# Patient Record
Sex: MALE | Race: BLACK OR AFRICAN AMERICAN | NOT HISPANIC OR LATINO | URBAN - METROPOLITAN AREA
[De-identification: names, ages, dates, MRNs, and addresses within clinical notes are randomized per-mention and may not be internally consistent; named-entity substitution may affect disease eponyms.]

---

## 2018-10-27 ENCOUNTER — INPATIENT (INPATIENT)
Facility: HOSPITAL | Age: 83
LOS: 5 days | Discharge: HOME | End: 2018-11-02
Attending: INTERNAL MEDICINE | Admitting: INTERNAL MEDICINE
Payer: MEDICARE

## 2018-10-27 VITALS
DIASTOLIC BLOOD PRESSURE: 60 MMHG | WEIGHT: 235.89 LBS | HEIGHT: 72 IN | OXYGEN SATURATION: 97 % | HEART RATE: 61 BPM | TEMPERATURE: 99 F | RESPIRATION RATE: 18 BRPM | SYSTOLIC BLOOD PRESSURE: 160 MMHG

## 2018-10-27 DIAGNOSIS — I10 ESSENTIAL (PRIMARY) HYPERTENSION: ICD-10-CM

## 2018-10-27 DIAGNOSIS — Z95.0 PRESENCE OF CARDIAC PACEMAKER: Chronic | ICD-10-CM

## 2018-10-27 DIAGNOSIS — Z29.9 ENCOUNTER FOR PROPHYLACTIC MEASURES, UNSPECIFIED: ICD-10-CM

## 2018-10-27 DIAGNOSIS — K81.9 CHOLECYSTITIS, UNSPECIFIED: ICD-10-CM

## 2018-10-27 DIAGNOSIS — F03.90 UNSPECIFIED DEMENTIA WITHOUT BEHAVIORAL DISTURBANCE: ICD-10-CM

## 2018-10-27 DIAGNOSIS — Z95.0 PRESENCE OF CARDIAC PACEMAKER: ICD-10-CM

## 2018-10-27 DIAGNOSIS — E11.9 TYPE 2 DIABETES MELLITUS WITHOUT COMPLICATIONS: ICD-10-CM

## 2018-10-27 LAB
ABO RH CONFIRMATION: SIGNIFICANT CHANGE UP
ALBUMIN SERPL ELPH-MCNC: 3.3 G/DL — SIGNIFICANT CHANGE UP (ref 3.3–5)
ALP SERPL-CCNC: 78 U/L — SIGNIFICANT CHANGE UP (ref 40–120)
ALT FLD-CCNC: 212 U/L — HIGH (ref 12–78)
ANION GAP SERPL CALC-SCNC: 8 MMOL/L — SIGNIFICANT CHANGE UP (ref 5–17)
APTT BLD: 27.9 SEC — SIGNIFICANT CHANGE UP (ref 27.5–37.4)
AST SERPL-CCNC: 264 U/L — HIGH (ref 15–37)
BASOPHILS # BLD AUTO: 0.02 K/UL — SIGNIFICANT CHANGE UP (ref 0–0.2)
BASOPHILS NFR BLD AUTO: 0.2 % — SIGNIFICANT CHANGE UP (ref 0–2)
BILIRUB SERPL-MCNC: 0.6 MG/DL — SIGNIFICANT CHANGE UP (ref 0.2–1.2)
BLD GP AB SCN SERPL QL: SIGNIFICANT CHANGE UP
BUN SERPL-MCNC: 25 MG/DL — HIGH (ref 7–23)
CALCIUM SERPL-MCNC: 8.2 MG/DL — LOW (ref 8.5–10.1)
CHLORIDE SERPL-SCNC: 108 MMOL/L — SIGNIFICANT CHANGE UP (ref 96–108)
CK MB BLD-MCNC: <0.8 % — SIGNIFICANT CHANGE UP (ref 0–3.5)
CK MB CFR SERPL CALC: <1 NG/ML — SIGNIFICANT CHANGE UP (ref 0.5–3.6)
CK SERPL-CCNC: 131 U/L — SIGNIFICANT CHANGE UP (ref 26–308)
CO2 SERPL-SCNC: 25 MMOL/L — SIGNIFICANT CHANGE UP (ref 22–31)
CREAT SERPL-MCNC: 1.7 MG/DL — HIGH (ref 0.5–1.3)
EOSINOPHIL # BLD AUTO: 0.02 K/UL — SIGNIFICANT CHANGE UP (ref 0–0.5)
EOSINOPHIL NFR BLD AUTO: 0.2 % — SIGNIFICANT CHANGE UP (ref 0–6)
GLUCOSE BLDC GLUCOMTR-MCNC: 149 MG/DL — HIGH (ref 70–99)
GLUCOSE BLDC GLUCOMTR-MCNC: 162 MG/DL — HIGH (ref 70–99)
GLUCOSE SERPL-MCNC: 140 MG/DL — HIGH (ref 70–99)
HCT VFR BLD CALC: 37.1 % — LOW (ref 39–50)
HGB BLD-MCNC: 12.2 G/DL — LOW (ref 13–17)
IMM GRANULOCYTES NFR BLD AUTO: 0.5 % — SIGNIFICANT CHANGE UP (ref 0–1.5)
INR BLD: 1.05 RATIO — SIGNIFICANT CHANGE UP (ref 0.88–1.16)
LACTATE SERPL-SCNC: 1.7 MMOL/L — SIGNIFICANT CHANGE UP (ref 0.7–2)
LIDOCAIN IGE QN: 809 U/L — HIGH (ref 73–393)
LYMPHOCYTES # BLD AUTO: 1.27 K/UL — SIGNIFICANT CHANGE UP (ref 1–3.3)
LYMPHOCYTES # BLD AUTO: 11.8 % — LOW (ref 13–44)
MCHC RBC-ENTMCNC: 30 PG — SIGNIFICANT CHANGE UP (ref 27–34)
MCHC RBC-ENTMCNC: 32.9 GM/DL — SIGNIFICANT CHANGE UP (ref 32–36)
MCV RBC AUTO: 91.4 FL — SIGNIFICANT CHANGE UP (ref 80–100)
MONOCYTES # BLD AUTO: 0.8 K/UL — SIGNIFICANT CHANGE UP (ref 0–0.9)
MONOCYTES NFR BLD AUTO: 7.4 % — SIGNIFICANT CHANGE UP (ref 2–14)
NEUTROPHILS # BLD AUTO: 8.63 K/UL — HIGH (ref 1.8–7.4)
NEUTROPHILS NFR BLD AUTO: 79.9 % — HIGH (ref 43–77)
NRBC # BLD: 0 /100 WBCS — SIGNIFICANT CHANGE UP (ref 0–0)
PLATELET # BLD AUTO: 160 K/UL — SIGNIFICANT CHANGE UP (ref 150–400)
POTASSIUM SERPL-MCNC: 5 MMOL/L — SIGNIFICANT CHANGE UP (ref 3.5–5.3)
POTASSIUM SERPL-SCNC: 5 MMOL/L — SIGNIFICANT CHANGE UP (ref 3.5–5.3)
PROT SERPL-MCNC: 6.9 GM/DL — SIGNIFICANT CHANGE UP (ref 6–8.3)
PROTHROM AB SERPL-ACNC: 11.5 SEC — SIGNIFICANT CHANGE UP (ref 9.8–12.7)
RBC # BLD: 4.06 M/UL — LOW (ref 4.2–5.8)
RBC # FLD: 13.4 % — SIGNIFICANT CHANGE UP (ref 10.3–14.5)
SODIUM SERPL-SCNC: 141 MMOL/L — SIGNIFICANT CHANGE UP (ref 135–145)
TROPONIN I SERPL-MCNC: 0.02 NG/ML — SIGNIFICANT CHANGE UP (ref 0.01–0.04)
WBC # BLD: 10.79 K/UL — HIGH (ref 3.8–10.5)
WBC # FLD AUTO: 10.79 K/UL — HIGH (ref 3.8–10.5)

## 2018-10-27 PROCEDURE — 93010 ELECTROCARDIOGRAM REPORT: CPT

## 2018-10-27 PROCEDURE — 76700 US EXAM ABDOM COMPLETE: CPT | Mod: 26

## 2018-10-27 PROCEDURE — 99223 1ST HOSP IP/OBS HIGH 75: CPT

## 2018-10-27 PROCEDURE — 71045 X-RAY EXAM CHEST 1 VIEW: CPT | Mod: 26

## 2018-10-27 PROCEDURE — 99285 EMERGENCY DEPT VISIT HI MDM: CPT

## 2018-10-27 PROCEDURE — 74177 CT ABD & PELVIS W/CONTRAST: CPT | Mod: 26

## 2018-10-27 RX ORDER — DEXTROSE 50 % IN WATER 50 %
25 SYRINGE (ML) INTRAVENOUS ONCE
Qty: 0 | Refills: 0 | Status: DISCONTINUED | OUTPATIENT
Start: 2018-10-27 | End: 2018-11-02

## 2018-10-27 RX ORDER — METOPROLOL TARTRATE 50 MG
1 TABLET ORAL
Qty: 0 | Refills: 0 | COMMUNITY

## 2018-10-27 RX ORDER — PANTOPRAZOLE SODIUM 20 MG/1
40 TABLET, DELAYED RELEASE ORAL
Qty: 0 | Refills: 0 | Status: DISCONTINUED | OUTPATIENT
Start: 2018-10-27 | End: 2018-11-02

## 2018-10-27 RX ORDER — LOSARTAN POTASSIUM 100 MG/1
100 TABLET, FILM COATED ORAL DAILY
Qty: 0 | Refills: 0 | Status: DISCONTINUED | OUTPATIENT
Start: 2018-10-27 | End: 2018-10-29

## 2018-10-27 RX ORDER — INFLUENZA VIRUS VACCINE 15; 15; 15; 15 UG/.5ML; UG/.5ML; UG/.5ML; UG/.5ML
0.5 SUSPENSION INTRAMUSCULAR ONCE
Qty: 0 | Refills: 0 | Status: DISCONTINUED | OUTPATIENT
Start: 2018-10-27 | End: 2018-11-02

## 2018-10-27 RX ORDER — LOSARTAN POTASSIUM 100 MG/1
1 TABLET, FILM COATED ORAL
Qty: 0 | Refills: 0 | COMMUNITY

## 2018-10-27 RX ORDER — METOPROLOL TARTRATE 50 MG
50 TABLET ORAL DAILY
Qty: 0 | Refills: 0 | Status: DISCONTINUED | OUTPATIENT
Start: 2018-10-27 | End: 2018-11-02

## 2018-10-27 RX ORDER — MEROPENEM 1 G/30ML
INJECTION INTRAVENOUS
Qty: 0 | Refills: 0 | Status: DISCONTINUED | OUTPATIENT
Start: 2018-10-27 | End: 2018-10-27

## 2018-10-27 RX ORDER — HEPARIN SODIUM 5000 [USP'U]/ML
5000 INJECTION INTRAVENOUS; SUBCUTANEOUS EVERY 12 HOURS
Qty: 0 | Refills: 0 | Status: DISCONTINUED | OUTPATIENT
Start: 2018-10-27 | End: 2018-11-02

## 2018-10-27 RX ORDER — ASPIRIN/CALCIUM CARB/MAGNESIUM 324 MG
81 TABLET ORAL DAILY
Qty: 0 | Refills: 0 | Status: DISCONTINUED | OUTPATIENT
Start: 2018-10-27 | End: 2018-10-27

## 2018-10-27 RX ORDER — LABETALOL HCL 100 MG
10 TABLET ORAL ONCE
Qty: 0 | Refills: 0 | Status: COMPLETED | OUTPATIENT
Start: 2018-10-27 | End: 2018-10-27

## 2018-10-27 RX ORDER — SODIUM CHLORIDE 9 MG/ML
2000 INJECTION INTRAMUSCULAR; INTRAVENOUS; SUBCUTANEOUS ONCE
Qty: 0 | Refills: 0 | Status: COMPLETED | OUTPATIENT
Start: 2018-10-27 | End: 2018-10-27

## 2018-10-27 RX ORDER — LINACLOTIDE 145 UG/1
1 CAPSULE, GELATIN COATED ORAL
Qty: 0 | Refills: 0 | COMMUNITY

## 2018-10-27 RX ORDER — ASPIRIN/CALCIUM CARB/MAGNESIUM 324 MG
1 TABLET ORAL
Qty: 0 | Refills: 0 | COMMUNITY

## 2018-10-27 RX ORDER — ACETAMINOPHEN 500 MG
650 TABLET ORAL EVERY 6 HOURS
Qty: 0 | Refills: 0 | Status: DISCONTINUED | OUTPATIENT
Start: 2018-10-27 | End: 2018-11-02

## 2018-10-27 RX ORDER — METRONIDAZOLE 500 MG
500 TABLET ORAL ONCE
Qty: 0 | Refills: 0 | Status: COMPLETED | OUTPATIENT
Start: 2018-10-27 | End: 2018-10-27

## 2018-10-27 RX ORDER — SODIUM CHLORIDE 9 MG/ML
1000 INJECTION, SOLUTION INTRAVENOUS
Qty: 0 | Refills: 0 | Status: DISCONTINUED | OUTPATIENT
Start: 2018-10-27 | End: 2018-10-28

## 2018-10-27 RX ORDER — MEROPENEM 1 G/30ML
INJECTION INTRAVENOUS
Qty: 0 | Refills: 0 | Status: DISCONTINUED | OUTPATIENT
Start: 2018-10-27 | End: 2018-11-02

## 2018-10-27 RX ORDER — SODIUM CHLORIDE 9 MG/ML
1000 INJECTION, SOLUTION INTRAVENOUS
Qty: 0 | Refills: 0 | Status: DISCONTINUED | OUTPATIENT
Start: 2018-10-27 | End: 2018-11-02

## 2018-10-27 RX ORDER — MEROPENEM 1 G/30ML
1000 INJECTION INTRAVENOUS EVERY 8 HOURS
Qty: 0 | Refills: 0 | Status: DISCONTINUED | OUTPATIENT
Start: 2018-10-28 | End: 2018-11-02

## 2018-10-27 RX ORDER — METRONIDAZOLE 500 MG
TABLET ORAL
Qty: 0 | Refills: 0 | Status: DISCONTINUED | OUTPATIENT
Start: 2018-10-27 | End: 2018-11-02

## 2018-10-27 RX ORDER — GLUCAGON INJECTION, SOLUTION 0.5 MG/.1ML
1 INJECTION, SOLUTION SUBCUTANEOUS ONCE
Qty: 0 | Refills: 0 | Status: DISCONTINUED | OUTPATIENT
Start: 2018-10-27 | End: 2018-11-02

## 2018-10-27 RX ORDER — METRONIDAZOLE 500 MG
500 TABLET ORAL EVERY 8 HOURS
Qty: 0 | Refills: 0 | Status: DISCONTINUED | OUTPATIENT
Start: 2018-10-28 | End: 2018-11-02

## 2018-10-27 RX ORDER — MEROPENEM 1 G/30ML
1000 INJECTION INTRAVENOUS ONCE
Qty: 0 | Refills: 0 | Status: COMPLETED | OUTPATIENT
Start: 2018-10-27 | End: 2018-10-27

## 2018-10-27 RX ORDER — DEXTROSE 50 % IN WATER 50 %
15 SYRINGE (ML) INTRAVENOUS ONCE
Qty: 0 | Refills: 0 | Status: DISCONTINUED | OUTPATIENT
Start: 2018-10-27 | End: 2018-11-02

## 2018-10-27 RX ORDER — DEXTROSE 50 % IN WATER 50 %
12.5 SYRINGE (ML) INTRAVENOUS ONCE
Qty: 0 | Refills: 0 | Status: DISCONTINUED | OUTPATIENT
Start: 2018-10-27 | End: 2018-11-02

## 2018-10-27 RX ORDER — HYDROCHLOROTHIAZIDE 25 MG
12.5 TABLET ORAL DAILY
Qty: 0 | Refills: 0 | Status: DISCONTINUED | OUTPATIENT
Start: 2018-10-27 | End: 2018-10-29

## 2018-10-27 RX ADMIN — SODIUM CHLORIDE 60 MILLILITER(S): 9 INJECTION, SOLUTION INTRAVENOUS at 20:36

## 2018-10-27 RX ADMIN — MEROPENEM 100 MILLIGRAM(S): 1 INJECTION INTRAVENOUS at 21:18

## 2018-10-27 RX ADMIN — Medication 100 MILLIGRAM(S): at 22:30

## 2018-10-27 RX ADMIN — Medication 10 MILLIGRAM(S): at 22:30

## 2018-10-27 RX ADMIN — SODIUM CHLORIDE 1000 MILLILITER(S): 9 INJECTION INTRAMUSCULAR; INTRAVENOUS; SUBCUTANEOUS at 17:28

## 2018-10-27 RX ADMIN — SODIUM CHLORIDE 2000 MILLILITER(S): 9 INJECTION INTRAMUSCULAR; INTRAVENOUS; SUBCUTANEOUS at 19:52

## 2018-10-27 RX ADMIN — Medication 10 MILLIGRAM(S): at 20:36

## 2018-10-27 NOTE — H&P ADULT - NSHPREVIEWOFSYSTEMS_GEN_ALL_CORE
REVIEW OF SYSTEMS:  Constitutional: Denies fever, chills or weight loss.  Reports feeling "tired" for past few days.  Eye: Denies eye pain, visual changes, discharge, blurred vision  ENT: Denies hearing changes, tinnitus, vertigo, sinus congestion, sore throat  Neck: Denies pain or stiffness  Respiratory: Denies cough, wheezing, chills, hemoptysis, shortness of breath, difficulty breathing  Cardiovascular: Denies chest pain, palpitations, dizziness, leg swelling  Gastrointestinal: See HPI  Genitourinary: Denies dysuria, frequency, hematuria, retention, incontinence  Neurological: Denies headaches, loss of strength, numbness, tremors.  + h/o dementia.    Skin: Denies itching, burning, rashes, lesions   Endocrine: Denies heat or cold intolerance, hair loss  Musculoskeletal: Denies joint pain or swelling, back, extremity pain  Psychiatric: Denies depression, anxiety, mood swings, difficulty sleeping, suicidal ideation  Hematology: Denies easy bruising, bleeding gums  Immunologic: Denies hives or eczema

## 2018-10-27 NOTE — H&P ADULT - PROBLEM SELECTOR PLAN 1
-RUQ sonogram ordered and pending  -Surgery consult called by ED; will follow-up recommendations  -Keep NPO  -IVF  -Broad spectrum antibiotics - meropenum -RUQ sonogram ordered and pending  -Surgery consult called by ED; will follow-up recommendations  -Keep NPO  -IVF  -Broad spectrum antibiotics

## 2018-10-27 NOTE — ED PROVIDER NOTE - MEDICAL DECISION MAKING DETAILS
possible cholecystitis noted - will admit for further care with Dr. Mccartney - Dr. Jordan called for admission as pt may need HIDA scan - Surgeon - Dr. Juarez aware and will consult.

## 2018-10-27 NOTE — ED ADULT NURSE NOTE - PMH
Dementia    DM II (diabetes mellitus, type II), controlled    HLD (hyperlipidemia)    HTN (hypertension)    Pacemaker

## 2018-10-27 NOTE — H&P ADULT - NSHPPHYSICALEXAM_GEN_ALL_CORE
Vital Signs Last 24 Hrs  T(C): 38.3 (27 Oct 2018 18:54), Max: 38.3 (27 Oct 2018 18:54)  T(F): 101 (27 Oct 2018 18:54), Max: 101 (27 Oct 2018 18:54)  HR: 80 (27 Oct 2018 18:41) (61 - 80)  BP: 192/88 (27 Oct 2018 18:41) (160/60 - 192/88)  BP(mean): --  RR: 16 (27 Oct 2018 18:41) (16 - 18)  SpO2: 98% (27 Oct 2018 18:41) (97% - 98%)    PHYSICAL EXAM:  GENERAL: NAD, well-groomed, well-developed  HEAD:  Atraumatic, Normocephalic  EYES: PERRLA, conjunctiva and sclera clear  NECK: Supple, No JVD, Normal thyroid  CHEST/LUNG: Clear to auscultation bilaterally; No rales, rhonchi, wheezing, or rubs  HEART: Clear S1, S2. Regular rate and rhythm; No murmurs appreciated  ABDOMEN: Distended, non-tender; Bowel sounds present  MSK: ROM intact in all extremities.  EXTREMITIES:  2+ Peripheral Pulses, No clubbing, cyanosis, or edema  NEUROLOGIC: Alert & oriented; no focal deficits  SKIN: No rashes or lesions

## 2018-10-27 NOTE — ED PROVIDER NOTE - OBJECTIVE STATEMENT
87 year old male with PMH of DM II, HTN, HLD, dementia, arthritis presenting to ED due to generalized abd pain started this AM, otherwise last bowel movement was Monday.  some feeling of SOB, polyuria, no blood in stool or urine. No nausea/vomiting at this time, no diarrhea, but constipation present. 87 year old male with PMH of DM II, HTN, HLD, dementia, PPM, arthritis presenting to ED due to generalized abd pain started this AM, otherwise last bowel movement was Monday.  some feeling of SOB, polyuria, no blood in stool or urine. No nausea/vomiting at this time, no diarrhea, but constipation present.

## 2018-10-27 NOTE — H&P ADULT - HISTORY OF PRESENT ILLNESS
86 y/o M with PMHx of DM, HTN, s/p PPM, dyslipidemia, and dementia presents complaining of generalized abdominal pain since this am.  States abdominal pain is crampy in nature, 7-8/10 severity, and without radiation to flank or back.  Patient states his last BM was on Monday 10/22/18.  Reports h/o constipation but states he has never gone longer than 3 days without a BM.  Reports feeling "tired" for the past few days but denies fever, chills, CP, palpitations, SOB, diarrhea or urinary symptoms.  Reports nausea this am but denies vomiting.  Denies recent travel or sick contacts.

## 2018-10-27 NOTE — ED PROVIDER NOTE - PMH
Dementia    DM II (diabetes mellitus, type II), controlled    HLD (hyperlipidemia)    HTN (hypertension) Dementia    DM II (diabetes mellitus, type II), controlled    HLD (hyperlipidemia)    HTN (hypertension)    Pacemaker

## 2018-10-27 NOTE — H&P ADULT - ASSESSMENT
86 y/o M with PMHx of DM, HTN, s/p PPM, dyslipidemia and dementia presents with generalized abdominal pain since this am and constipation (last BM 10/22/18).  CT A/P shows questionable acute cholecystitis and patient found to be febrile in the ED to 101.

## 2018-10-28 LAB
ALBUMIN SERPL ELPH-MCNC: 2.9 G/DL — LOW (ref 3.3–5)
ALP SERPL-CCNC: 72 U/L — SIGNIFICANT CHANGE UP (ref 40–120)
ALT FLD-CCNC: 170 U/L — HIGH (ref 12–78)
AMYLASE P1 CFR SERPL: 55 U/L — SIGNIFICANT CHANGE UP (ref 25–115)
ANION GAP SERPL CALC-SCNC: 9 MMOL/L — SIGNIFICANT CHANGE UP (ref 5–17)
AST SERPL-CCNC: 111 U/L — HIGH (ref 15–37)
BILIRUB DIRECT SERPL-MCNC: 0.29 MG/DL — HIGH (ref 0.05–0.2)
BILIRUB INDIRECT FLD-MCNC: 0.8 MG/DL — SIGNIFICANT CHANGE UP (ref 0.2–1)
BILIRUB SERPL-MCNC: 1.1 MG/DL — SIGNIFICANT CHANGE UP (ref 0.2–1.2)
BUN SERPL-MCNC: 24 MG/DL — HIGH (ref 7–23)
CALCIUM SERPL-MCNC: 7.3 MG/DL — LOW (ref 8.5–10.1)
CHLORIDE SERPL-SCNC: 108 MMOL/L — SIGNIFICANT CHANGE UP (ref 96–108)
CO2 SERPL-SCNC: 23 MMOL/L — SIGNIFICANT CHANGE UP (ref 22–31)
CREAT SERPL-MCNC: 2.11 MG/DL — HIGH (ref 0.5–1.3)
GLUCOSE BLDC GLUCOMTR-MCNC: 161 MG/DL — HIGH (ref 70–99)
GLUCOSE BLDC GLUCOMTR-MCNC: 166 MG/DL — HIGH (ref 70–99)
GLUCOSE BLDC GLUCOMTR-MCNC: 173 MG/DL — HIGH (ref 70–99)
GLUCOSE BLDC GLUCOMTR-MCNC: 192 MG/DL — HIGH (ref 70–99)
GLUCOSE SERPL-MCNC: 164 MG/DL — HIGH (ref 70–99)
HBA1C BLD-MCNC: 7 % — HIGH (ref 4–5.6)
HCT VFR BLD CALC: 34.5 % — LOW (ref 39–50)
HGB BLD-MCNC: 11.3 G/DL — LOW (ref 13–17)
LIDOCAIN IGE QN: 222 U/L — SIGNIFICANT CHANGE UP (ref 73–393)
MCHC RBC-ENTMCNC: 30.4 PG — SIGNIFICANT CHANGE UP (ref 27–34)
MCHC RBC-ENTMCNC: 32.8 GM/DL — SIGNIFICANT CHANGE UP (ref 32–36)
MCV RBC AUTO: 92.7 FL — SIGNIFICANT CHANGE UP (ref 80–100)
NRBC # BLD: 0 /100 WBCS — SIGNIFICANT CHANGE UP (ref 0–0)
PLATELET # BLD AUTO: 140 K/UL — LOW (ref 150–400)
POTASSIUM SERPL-MCNC: 4.8 MMOL/L — SIGNIFICANT CHANGE UP (ref 3.5–5.3)
POTASSIUM SERPL-SCNC: 4.8 MMOL/L — SIGNIFICANT CHANGE UP (ref 3.5–5.3)
PROT SERPL-MCNC: 7 GM/DL — SIGNIFICANT CHANGE UP (ref 6–8.3)
RBC # BLD: 3.72 M/UL — LOW (ref 4.2–5.8)
RBC # FLD: 13.7 % — SIGNIFICANT CHANGE UP (ref 10.3–14.5)
SODIUM SERPL-SCNC: 140 MMOL/L — SIGNIFICANT CHANGE UP (ref 135–145)
WBC # BLD: 13.55 K/UL — HIGH (ref 3.8–10.5)
WBC # FLD AUTO: 13.55 K/UL — HIGH (ref 3.8–10.5)

## 2018-10-28 RX ORDER — SODIUM CHLORIDE 9 MG/ML
1000 INJECTION, SOLUTION INTRAVENOUS
Qty: 0 | Refills: 0 | Status: DISCONTINUED | OUTPATIENT
Start: 2018-10-28 | End: 2018-11-02

## 2018-10-28 RX ADMIN — Medication 650 MILLIGRAM(S): at 17:30

## 2018-10-28 RX ADMIN — MEROPENEM 100 MILLIGRAM(S): 1 INJECTION INTRAVENOUS at 05:12

## 2018-10-28 RX ADMIN — Medication 100 MILLIGRAM(S): at 13:43

## 2018-10-28 RX ADMIN — Medication 650 MILLIGRAM(S): at 05:48

## 2018-10-28 RX ADMIN — Medication 12.5 MILLIGRAM(S): at 05:48

## 2018-10-28 RX ADMIN — Medication 50 MILLIGRAM(S): at 05:47

## 2018-10-28 RX ADMIN — MEROPENEM 100 MILLIGRAM(S): 1 INJECTION INTRAVENOUS at 21:44

## 2018-10-28 RX ADMIN — HEPARIN SODIUM 5000 UNIT(S): 5000 INJECTION INTRAVENOUS; SUBCUTANEOUS at 05:48

## 2018-10-28 RX ADMIN — PANTOPRAZOLE SODIUM 40 MILLIGRAM(S): 20 TABLET, DELAYED RELEASE ORAL at 07:59

## 2018-10-28 RX ADMIN — Medication 650 MILLIGRAM(S): at 16:11

## 2018-10-28 RX ADMIN — SODIUM CHLORIDE 75 MILLILITER(S): 9 INJECTION, SOLUTION INTRAVENOUS at 17:57

## 2018-10-28 RX ADMIN — HEPARIN SODIUM 5000 UNIT(S): 5000 INJECTION INTRAVENOUS; SUBCUTANEOUS at 17:56

## 2018-10-28 RX ADMIN — Medication 100 MILLIGRAM(S): at 21:45

## 2018-10-28 RX ADMIN — LOSARTAN POTASSIUM 100 MILLIGRAM(S): 100 TABLET, FILM COATED ORAL at 05:47

## 2018-10-28 RX ADMIN — MEROPENEM 100 MILLIGRAM(S): 1 INJECTION INTRAVENOUS at 13:42

## 2018-10-28 RX ADMIN — Medication 100 MILLIGRAM(S): at 05:13

## 2018-10-28 RX ADMIN — SODIUM CHLORIDE 75 MILLILITER(S): 9 INJECTION, SOLUTION INTRAVENOUS at 11:51

## 2018-10-28 RX ADMIN — SODIUM CHLORIDE 60 MILLILITER(S): 9 INJECTION, SOLUTION INTRAVENOUS at 05:12

## 2018-10-28 NOTE — PROGRESS NOTE ADULT - SUBJECTIVE AND OBJECTIVE BOX
INTERVAL HPI/OVERNIGHT EVENTS: No acute events overnight Patient states that he feels alright. No change since when he was in ED.    Vital Signs Last 24 Hrs  T(C): 38.3 (28 Oct 2018 05:21), Max: 38.3 (27 Oct 2018 18:54)  T(F): 101 (28 Oct 2018 05:21), Max: 101 (27 Oct 2018 18:54)  HR: 79 (28 Oct 2018 05:21) (61 - 82)  BP: 155/50 (28 Oct 2018 05:21) (131/63 - 192/88)  BP(mean): --  RR: 18 (28 Oct 2018 05:21) (16 - 18)  SpO2: 96% (28 Oct 2018 05:21) (95% - 100%)    MEDICATIONS  (STANDING):  dextrose 5% + sodium chloride 0.45%. 1000 milliLiter(s) (60 mL/Hr) IV Continuous <Continuous>  dextrose 5%. 1000 milliLiter(s) (50 mL/Hr) IV Continuous <Continuous>  dextrose 50% Injectable 12.5 Gram(s) IV Push once  dextrose 50% Injectable 25 Gram(s) IV Push once  dextrose 50% Injectable 25 Gram(s) IV Push once  heparin  Injectable 5000 Unit(s) SubCutaneous every 12 hours  hydrochlorothiazide 12.5 milliGRAM(s) Oral daily  influenza   Vaccine 0.5 milliLiter(s) IntraMuscular once  levoFLOXacin IVPB 500 milliGRAM(s) IV Intermittent every 24 hours  levoFLOXacin IVPB      losartan 100 milliGRAM(s) Oral daily  meropenem  IVPB      meropenem  IVPB 1000 milliGRAM(s) IV Intermittent every 8 hours  metoprolol succinate ER 50 milliGRAM(s) Oral daily  metroNIDAZOLE  IVPB      metroNIDAZOLE  IVPB 500 milliGRAM(s) IV Intermittent every 8 hours  pantoprazole    Tablet 40 milliGRAM(s) Oral before breakfast    MEDICATIONS  (PRN):  acetaminophen  Suppository .. 650 milliGRAM(s) Rectal every 6 hours PRN Temp greater or equal to 38C (100.4F)  dextrose 40% Gel 15 Gram(s) Oral once PRN Blood Glucose LESS THAN 70 milliGRAM(s)/deciliter  glucagon  Injectable 1 milliGRAM(s) IntraMuscular once PRN Glucose LESS THAN 70 milligrams/deciliter      PHYSICAL EXAM    GENERAL: AAO in NAD  CHEST/LUNG: No respiratory distress  ABDOMEN: Soft, markedly distended. RUQ TTP. No epigastric tenderness. No change since prior exam.    I&O:  I&O's Detail    27 Oct 2018 07:01  -  28 Oct 2018 07:00  --------------------------------------------------------  IN:  Total IN: 0 mL    OUT:    Voided: 450 mL  Total OUT: 450 mL    Total NET: -450 mL          LABS:                        12.2   10.79 )-----------( 160      ( 27 Oct 2018 15:56 )             37.1     10-27    141  |  108  |  25<H>  ----------------------------<  140<H>  5.0   |  25  |  1.70<H>    Ca    8.2<L>      27 Oct 2018 15:56    TPro  6.9  /  Alb  3.3  /  TBili  0.6  /  DBili  x   /  AST  264<H>  /  ALT  212<H>  /  AlkPhos  78  10-27    PT/INR - ( 27 Oct 2018 15:56 )   PT: 11.5 sec;   INR: 1.05 ratio         PTT - ( 27 Oct 2018 15:56 )  PTT:27.9 sec          Impression: 87M with likely gallstone pancreatitis, resolving with persistent RUQ pain.    Plan:  AM labs  Pain control  Will need to discuss option of having GB removed during this hospitalization given medical risks.  Continue ABX

## 2018-10-28 NOTE — PROGRESS NOTE ADULT - SUBJECTIVE AND OBJECTIVE BOX
INTERVAL HPI/OVERNIGHT EVENTS:    events  of  past  24  hours  noted  discussed  with ER MD gonzalez  to  medicine  by  hospitalist  as per  [facility]  protocol  seen  by  surgery  continues  with  AB  for  HID  scan    REVIEW OF SYSTEMS:  CONSTITUTIONAL:  no  complaints    NECK: No pain or stiffnes  RESPIRATORY: No SOB   CARDIOVASCULAR: No chest pain, palpitations, dizziness,   GASTROINTESTINAL:mild   abdominal pain. No nausea, vomiting,   NEUROLOGICAL: No headaches, no  blurry  vision no  dizziness  SKIN: No itching,   MUSCULOSKELETAL: No pain    MEDICATION:  acetaminophen  Suppository .. 650 milliGRAM(s) Rectal every 6 hours PRN  dextrose 40% Gel 15 Gram(s) Oral once PRN  dextrose 5% + sodium chloride 0.45%. 1000 milliLiter(s) IV Continuous <Continuous>  dextrose 5%. 1000 milliLiter(s) IV Continuous <Continuous>  dextrose 50% Injectable 12.5 Gram(s) IV Push once  dextrose 50% Injectable 25 Gram(s) IV Push once  dextrose 50% Injectable 25 Gram(s) IV Push once  glucagon  Injectable 1 milliGRAM(s) IntraMuscular once PRN  heparin  Injectable 5000 Unit(s) SubCutaneous every 12 hours  hydrochlorothiazide 12.5 milliGRAM(s) Oral daily  influenza   Vaccine 0.5 milliLiter(s) IntraMuscular once  levoFLOXacin IVPB 500 milliGRAM(s) IV Intermittent every 24 hours  levoFLOXacin IVPB      losartan 100 milliGRAM(s) Oral daily  meropenem  IVPB      meropenem  IVPB 1000 milliGRAM(s) IV Intermittent every 8 hours  metoprolol succinate ER 50 milliGRAM(s) Oral daily  metroNIDAZOLE  IVPB      metroNIDAZOLE  IVPB 500 milliGRAM(s) IV Intermittent every 8 hours  pantoprazole    Tablet 40 milliGRAM(s) Oral before breakfast    Vital Signs Last 24 Hrs  T(C): 37.2 (28 Oct 2018 10:50), Max: 38.3 (27 Oct 2018 18:54)  T(F): 99 (28 Oct 2018 10:50), Max: 101 (27 Oct 2018 18:54)  HR: 75 (28 Oct 2018 10:50) (61 - 82)  BP: 159/72 (28 Oct 2018 10:50) (131/63 - 192/88)  BP(mean): --  RR: 18 (28 Oct 2018 10:50) (16 - 18)  SpO2: 94% (28 Oct 2018 10:50) (94% - 100%)    PHYSICAL EXAM:  GENERAL: NAD, well-groomed, well-developed  EYES:  conjunctiva and sclera clear  ENMT:  Moist mucous membranes,   NECK: Supple, No JVD, Normal thyroid  NERVOUS SYSTEM:  Alert oriented   no  focal  deficits;   CHEST/LUNG: Clear    HEART: Regular rate and rhythm; No murmurs, rubs, or gallops  ABDOMEN: Soft, rt  UQ  Tenderness   Nondistended; no  guarding  Bowel sounds present  EXTREMITIES:  no  edema no  tenderness  SKIN: No rashes   LABS:                        11.3   13.55 )-----------( 140      ( 28 Oct 2018 08:30 )             34.5     10-28    140  |  108  |  24<H>  ----------------------------<  164<H>  4.8   |  23  |  2.11<H>    Ca    7.3<L>      28 Oct 2018 08:30    TPro  7.0  /  Alb  2.9<L>  /  TBili  1.1  /  DBili  .29<H>  /  AST  111<H>  /  ALT  170<H>  /  AlkPhos  72  10-28    PT/INR - ( 27 Oct 2018 15:56 )   PT: 11.5 sec;   INR: 1.05 ratio         PTT - ( 27 Oct 2018 15:56 )  PTT:27.9 sec    CAPILLARY BLOOD GLUCOSE      POCT Blood Glucose.: 166 mg/dL (28 Oct 2018 11:29)  POCT Blood Glucose.: 173 mg/dL (28 Oct 2018 05:58)  POCT Blood Glucose.: 149 mg/dL (27 Oct 2018 22:44)  POCT Blood Glucose.: 162 mg/dL (27 Oct 2018 21:11)      RADIOLOGY & ADDITIONAL TESTS:    Imaging reports  Personally Reviewed:  [x ] YES  [ ] NO    Consultant(s) Notes Reviewed:  [x ] YES  [ ] NO    Care Discussed with Consultants/Other Providers [ x] YES  [ ] NO  Assessment:  · Assessment      88 y/o M with PMHx of DM, HTN, s/p PPM, dyslipidemia and dementia presents with generalized abdominal pain since this am and constipation (last BM 10/22/18).  CT A/P shows questionable acute cholecystitis and patient found to be febrile in the ED to 101.      Problem/Plan - 1:  ·  Problem: Cholecystitis.  Plan: -IVF  -Broad spectrum antibiotics.further w/u  and  treatment  as per  surgery    Problem/Plan - 2:  ·  Problem: HTN (hypertension).  Plan: -IV labetolol prn  -Cont to monitor.     Problem/Plan - 3:  ·  Problem: DM II (diabetes mellitus, type II), controlled.  Plan: -Hold glipizide & metformin as patient is NPO  -Monitor FS.  cover  with  insulin  as per  scale    Problem/Plan - 4:  ·  Problem: Dementia.  Plan: -Stable.     Problem/Plan - 5:  ·  Problem: Pacemaker.  Plan: -Stable.     Problem/Plan - 6:  Problem: Prophylactic measure. Plan: -DVT & GI prophylaxis

## 2018-10-29 LAB
ALBUMIN SERPL ELPH-MCNC: 2.5 G/DL — LOW (ref 3.3–5)
ALP SERPL-CCNC: 84 U/L — SIGNIFICANT CHANGE UP (ref 40–120)
ALT FLD-CCNC: 116 U/L — HIGH (ref 12–78)
AMYLASE P1 CFR SERPL: 25 U/L — SIGNIFICANT CHANGE UP (ref 25–115)
ANION GAP SERPL CALC-SCNC: 11 MMOL/L — SIGNIFICANT CHANGE UP (ref 5–17)
AST SERPL-CCNC: 54 U/L — HIGH (ref 15–37)
BILIRUB SERPL-MCNC: 1.2 MG/DL — SIGNIFICANT CHANGE UP (ref 0.2–1.2)
BUN SERPL-MCNC: 28 MG/DL — HIGH (ref 7–23)
CALCIUM SERPL-MCNC: 7.9 MG/DL — LOW (ref 8.5–10.1)
CHLORIDE SERPL-SCNC: 107 MMOL/L — SIGNIFICANT CHANGE UP (ref 96–108)
CO2 SERPL-SCNC: 24 MMOL/L — SIGNIFICANT CHANGE UP (ref 22–31)
CREAT SERPL-MCNC: 2.27 MG/DL — HIGH (ref 0.5–1.3)
GLUCOSE BLDC GLUCOMTR-MCNC: 142 MG/DL — HIGH (ref 70–99)
GLUCOSE BLDC GLUCOMTR-MCNC: 148 MG/DL — HIGH (ref 70–99)
GLUCOSE BLDC GLUCOMTR-MCNC: 157 MG/DL — HIGH (ref 70–99)
GLUCOSE SERPL-MCNC: 157 MG/DL — HIGH (ref 70–99)
HCT VFR BLD CALC: 33.2 % — LOW (ref 39–50)
HGB BLD-MCNC: 10.8 G/DL — LOW (ref 13–17)
LIDOCAIN IGE QN: 99 U/L — SIGNIFICANT CHANGE UP (ref 73–393)
MAGNESIUM SERPL-MCNC: 2.5 MG/DL — SIGNIFICANT CHANGE UP (ref 1.6–2.6)
MCHC RBC-ENTMCNC: 30.1 PG — SIGNIFICANT CHANGE UP (ref 27–34)
MCHC RBC-ENTMCNC: 32.5 GM/DL — SIGNIFICANT CHANGE UP (ref 32–36)
MCV RBC AUTO: 92.5 FL — SIGNIFICANT CHANGE UP (ref 80–100)
NRBC # BLD: 0 /100 WBCS — SIGNIFICANT CHANGE UP (ref 0–0)
PHOSPHATE SERPL-MCNC: 2.4 MG/DL — LOW (ref 2.5–4.5)
PLATELET # BLD AUTO: 129 K/UL — LOW (ref 150–400)
POTASSIUM SERPL-MCNC: 4.1 MMOL/L — SIGNIFICANT CHANGE UP (ref 3.5–5.3)
POTASSIUM SERPL-SCNC: 4.1 MMOL/L — SIGNIFICANT CHANGE UP (ref 3.5–5.3)
PROT SERPL-MCNC: 6.1 GM/DL — SIGNIFICANT CHANGE UP (ref 6–8.3)
RBC # BLD: 3.59 M/UL — LOW (ref 4.2–5.8)
RBC # FLD: 13.8 % — SIGNIFICANT CHANGE UP (ref 10.3–14.5)
SODIUM SERPL-SCNC: 142 MMOL/L — SIGNIFICANT CHANGE UP (ref 135–145)
WBC # BLD: 14.59 K/UL — HIGH (ref 3.8–10.5)
WBC # FLD AUTO: 14.59 K/UL — HIGH (ref 3.8–10.5)

## 2018-10-29 PROCEDURE — 78226 HEPATOBILIARY SYSTEM IMAGING: CPT | Mod: 26

## 2018-10-29 RX ORDER — MORPHINE SULFATE 50 MG/1
4 CAPSULE, EXTENDED RELEASE ORAL ONCE
Qty: 0 | Refills: 0 | Status: DISCONTINUED | OUTPATIENT
Start: 2018-10-29 | End: 2018-10-29

## 2018-10-29 RX ADMIN — LOSARTAN POTASSIUM 100 MILLIGRAM(S): 100 TABLET, FILM COATED ORAL at 05:42

## 2018-10-29 RX ADMIN — MEROPENEM 100 MILLIGRAM(S): 1 INJECTION INTRAVENOUS at 13:26

## 2018-10-29 RX ADMIN — Medication 100 MILLIGRAM(S): at 05:42

## 2018-10-29 RX ADMIN — MORPHINE SULFATE 4 MILLIGRAM(S): 50 CAPSULE, EXTENDED RELEASE ORAL at 09:53

## 2018-10-29 RX ADMIN — Medication 12.5 MILLIGRAM(S): at 05:42

## 2018-10-29 RX ADMIN — HEPARIN SODIUM 5000 UNIT(S): 5000 INJECTION INTRAVENOUS; SUBCUTANEOUS at 17:49

## 2018-10-29 RX ADMIN — MEROPENEM 100 MILLIGRAM(S): 1 INJECTION INTRAVENOUS at 05:42

## 2018-10-29 RX ADMIN — HEPARIN SODIUM 5000 UNIT(S): 5000 INJECTION INTRAVENOUS; SUBCUTANEOUS at 05:42

## 2018-10-29 RX ADMIN — Medication 100 MILLIGRAM(S): at 22:16

## 2018-10-29 RX ADMIN — SODIUM CHLORIDE 75 MILLILITER(S): 9 INJECTION, SOLUTION INTRAVENOUS at 17:49

## 2018-10-29 RX ADMIN — Medication 100 MILLIGRAM(S): at 13:27

## 2018-10-29 RX ADMIN — MEROPENEM 100 MILLIGRAM(S): 1 INJECTION INTRAVENOUS at 22:16

## 2018-10-29 RX ADMIN — Medication 50 MILLIGRAM(S): at 05:42

## 2018-10-29 RX ADMIN — PANTOPRAZOLE SODIUM 40 MILLIGRAM(S): 20 TABLET, DELAYED RELEASE ORAL at 08:02

## 2018-10-29 NOTE — PROGRESS NOTE ADULT - SUBJECTIVE AND OBJECTIVE BOX
INTERVAL HPI/OVERNIGHT EVENTS:        REVIEW OF SYSTEMS:  CONSTITUTIONAL:  comfortable  mild  abd  pain  no  CP  no  SOB      MEDICATION:  acetaminophen  Suppository .. 650 milliGRAM(s) Rectal every 6 hours PRN  dextrose 40% Gel 15 Gram(s) Oral once PRN  dextrose 5% + sodium chloride 0.45%. 1000 milliLiter(s) IV Continuous <Continuous>  dextrose 5%. 1000 milliLiter(s) IV Continuous <Continuous>  dextrose 50% Injectable 12.5 Gram(s) IV Push once  dextrose 50% Injectable 25 Gram(s) IV Push once  dextrose 50% Injectable 25 Gram(s) IV Push once  glucagon  Injectable 1 milliGRAM(s) IntraMuscular once PRN  heparin  Injectable 5000 Unit(s) SubCutaneous every 12 hours  influenza   Vaccine 0.5 milliLiter(s) IntraMuscular once  meropenem  IVPB      meropenem  IVPB 1000 milliGRAM(s) IV Intermittent every 8 hours  metoprolol succinate ER 50 milliGRAM(s) Oral daily  metroNIDAZOLE  IVPB      metroNIDAZOLE  IVPB 500 milliGRAM(s) IV Intermittent every 8 hours  pantoprazole    Tablet 40 milliGRAM(s) Oral before breakfast    Vital Signs Last 24 Hrs  T(C): 36.8 (29 Oct 2018 12:27), Max: 38.1 (28 Oct 2018 23:31)  T(F): 98.3 (29 Oct 2018 12:27), Max: 100.6 (28 Oct 2018 23:31)  HR: 74 (29 Oct 2018 12:27) (72 - 84)  BP: 144/61 (29 Oct 2018 12:27) (137/66 - 163/58)  BP(mean): --  RR: 16 (29 Oct 2018 12:27) (16 - 18)  SpO2: 94% (29 Oct 2018 12:27) (93% - 94%)    PHYSICAL EXAM:  GENERAL: NAD, well-groomed, well-developed  EYES:  conjunctiva and sclera clear  ENMT:  Moist mucous membranes,   NECK: Supple, No JVD, Normal thyroid  NERVOUS SYSTEM:  Alert oriented   no  focal  deficits;   CHEST/LUNG: Clear    HEART: Regular rate and rhythm; No murmurs, rubs, or gallops  ABDOMEN: Soft, Nontender, Nondistended; Bowel sounds present  EXTREMITIES:  no  edema no  tenderness  SKIN: No rashes   LABS:                        10.8   14.59 )-----------( 129      ( 29 Oct 2018 07:21 )             33.2     10-29    142  |  107  |  28<H>  ----------------------------<  157<H>  4.1   |  24  |  2.27<H>    Ca    7.9<L>      29 Oct 2018 07:21  Phos  2.4     10-29  Mg     2.5     10-29    TPro  6.1  /  Alb  2.5<L>  /  TBili  1.2  /  DBili  x   /  AST  54<H>  /  ALT  116<H>  /  AlkPhos  84  10-29    PT/INR - ( 27 Oct 2018 15:56 )   PT: 11.5 sec;   INR: 1.05 ratio         PTT - ( 27 Oct 2018 15:56 )  PTT:27.9 sec    CAPILLARY BLOOD GLUCOSE      POCT Blood Glucose.: 148 mg/dL (29 Oct 2018 13:03)  POCT Blood Glucose.: 192 mg/dL (28 Oct 2018 23:11)  POCT Blood Glucose.: 161 mg/dL (28 Oct 2018 16:18)  < from: NM Hepatobiliary Scan w/wo Gall Bladder (10.29.18 @ 11:14) >  XAM:  NM HEPATOBILIARY IMG                            PROCEDURE DATE:  10/29/2018          INTERPRETATION:  CLINICAL INFORMATION: 87-year-old male,   cholelithiasis/sludge and prominent gallbladder wall on ultrasound,   evaluate for acute cholecystitis.    RADIOPHARMACEUTICAL: 3 mCi Tc-99m-Mebrofenin, I.V.; 2 doses    TECHNIQUE:   Dynamic imaging of the anterior abdomen was performed for 2   hours after the injection of radiotracer followed by static images of the   abdomen in the anterior andright anterior oblique projections.  Morphine   4 mg I.V. and a second dose of radiotracer were administered at 1 hour.      COMPARISON: No prior hepatobiliary scan available.    FINDINGS: There is prompt, homogeneous uptake of radiotracer by the   hepatocytes. Activity is first seen in the bowel at 15 minutes. The   gallbladder is not visualized at any time during the study, despite   administration of morphine. There is good clearance of activity from the   liver at the end of the study.    IMPRESSION: Abnormal morphine-augmented hepatobiliary scan.    Findings consistent with acute cholecystitis.    < end of copied text >      RADIOLOGY & ADDITIONAL TESTS:    Imaging reports  Personally Reviewed:  [ x] YES  [ ] NO    Consultant(s) Notes Reviewed:  [x ] YES  [ ] NO    Care Discussed with Consultants/Other Providers [x ] YES  [ ] NO  Assessment:  · Assessment		      Problem/Plan - 1:  ·  Problem: Cholecystitis.  Plan: -IVF  -Broad spectrum antibiotics.further w/u  and  treatment  as per  surgery and  family's  decision    Problem/Plan - 2:  ·  Problem: HTN (hypertension).  Plan: -IV labetolol prn  -Cont to monitor.     Problem/Plan - 3:  ·  Problem: DM II (diabetes mellitus, type II), controlled.  Plan: -Hold glipizide & metformin as patient is NPO  -Monitor FS.  cover  with  insulin  as per  scale    Problem/Plan - 4:  ·  Problem: Dementia.  Plan: -Stable.     Problem/Plan - 5:  ·  Problem: Pacemaker.  Plan: -Stable.     Problem/Plan - 6:  Problem: Prophylactic measure. Plan: -DVT & GI prophylaxis

## 2018-10-29 NOTE — PROGRESS NOTE ADULT - SUBJECTIVE AND OBJECTIVE BOX
SURGERY PROGRESS HPI:  Pt seen and examined at bedside. Denies pain or complaints. Pt denies nausea and vomiting. Pt denies chest pain, SOB, dizziness, fever, chills.     Vital Signs Last 24 Hrs  T(C): 37.9 (29 Oct 2018 05:16), Max: 38.1 (28 Oct 2018 23:31)  T(F): 100.3 (29 Oct 2018 05:16), Max: 100.6 (28 Oct 2018 23:31)  HR: 81 (29 Oct 2018 05:16) (72 - 84)  BP: 137/88 (29 Oct 2018 05:16) (137/66 - 163/58)  RR: 16 (29 Oct 2018 05:16) (16 - 18)  SpO2: 94% (29 Oct 2018 05:16) (93% - 94%)      PHYSICAL EXAM:    GENERAL: NAD  CHEST/LUNG: Clear to ausculation, bilaterally   HEART: RRR S1S2  ABDOMEN: distended, +BS, soft, mild RUQ and epigastric tenderness, no guarding  EXTREMITIES:  calf soft, non tender b/l    I&O's Detail    27 Oct 2018 07:01  -  28 Oct 2018 07:00  --------------------------------------------------------  IN:    dextrose 5% + sodium chloride 0.45%.: 600 mL    Solution: 50 mL    Solution: 200 mL  Total IN: 850 mL    OUT:    Voided: 450 mL  Total OUT: 450 mL    Total NET: 400 mL      28 Oct 2018 07:01  -  29 Oct 2018 06:18  --------------------------------------------------------  IN:    dextrose 5% + sodium chloride 0.45%.: 850 mL    Solution: 50 mL    Solution: 100 mL    Solution: 100 mL  Total IN: 1100 mL    OUT:    Voided: 800 mL  Total OUT: 800 mL    Total NET: 300 mL    LABS:                        11.3   13.55 )-----------( 140      ( 28 Oct 2018 08:30 )             34.5     10-28    140  |  108  |  24<H>  ----------------------------<  164<H>  4.8   |  23  |  2.11<H>    Ca    7.3<L>      28 Oct 2018 08:30    TPro  7.0  /  Alb  2.9<L>  /  TBili  1.1  /  DBili  .29<H>  /  AST  111<H>  /  ALT  170<H>  /  AlkPhos  72  10-28    PT/INR - ( 27 Oct 2018 15:56 )   PT: 11.5 sec;   INR: 1.05 ratio    PTT - ( 27 Oct 2018 15:56 )  PTT:27.9 sec        Impression: 87M with likely gallstone pancreatitis, resolving with persistent RUQ pain.    Plan:  -f/u HIDA  -NPO, IV hydration  -Antibiotics  -Continue medical management  -Will discuss with attending

## 2018-10-29 NOTE — CHART NOTE - NSCHARTNOTEFT_GEN_A_CORE
I and my supervising physician discussed risks and benefits  of percutaneous cholecystostomy tube placement as well as not performing procedure; such as sepsis, septic shock and death.  The patient and spouse are refusing procedure today, they want to discuss the procedure with the rest of family members  -Will f/u

## 2018-10-29 NOTE — CHART NOTE - NSCHARTNOTEFT_GEN_A_CORE
Informed by nuclear radiology that patient has positive HIDA scan.   Discussed patient with Dr. Hermosillo, will need Cholecystectomy.  Labs reviewed, Cr increasing, HCTZ and Losartan d/c'ed. Trend renal function.

## 2018-10-30 LAB
ALBUMIN SERPL ELPH-MCNC: 2.4 G/DL — LOW (ref 3.3–5)
ALP SERPL-CCNC: 92 U/L — SIGNIFICANT CHANGE UP (ref 40–120)
ALT FLD-CCNC: 88 U/L — HIGH (ref 12–78)
ANION GAP SERPL CALC-SCNC: 11 MMOL/L — SIGNIFICANT CHANGE UP (ref 5–17)
APTT BLD: 31.1 SEC — SIGNIFICANT CHANGE UP (ref 27.5–37.4)
AST SERPL-CCNC: 40 U/L — HIGH (ref 15–37)
BASOPHILS # BLD AUTO: 0.03 K/UL — SIGNIFICANT CHANGE UP (ref 0–0.2)
BASOPHILS NFR BLD AUTO: 0.2 % — SIGNIFICANT CHANGE UP (ref 0–2)
BILIRUB DIRECT SERPL-MCNC: 0.33 MG/DL — HIGH (ref 0.05–0.2)
BILIRUB INDIRECT FLD-MCNC: 0.4 MG/DL — SIGNIFICANT CHANGE UP (ref 0.2–1)
BILIRUB SERPL-MCNC: 0.7 MG/DL — SIGNIFICANT CHANGE UP (ref 0.2–1.2)
BLD GP AB SCN SERPL QL: SIGNIFICANT CHANGE UP
BUN SERPL-MCNC: 33 MG/DL — HIGH (ref 7–23)
CALCIUM SERPL-MCNC: 7.9 MG/DL — LOW (ref 8.5–10.1)
CHLORIDE SERPL-SCNC: 107 MMOL/L — SIGNIFICANT CHANGE UP (ref 96–108)
CO2 SERPL-SCNC: 24 MMOL/L — SIGNIFICANT CHANGE UP (ref 22–31)
CREAT SERPL-MCNC: 2.29 MG/DL — HIGH (ref 0.5–1.3)
EOSINOPHIL # BLD AUTO: 0.1 K/UL — SIGNIFICANT CHANGE UP (ref 0–0.5)
EOSINOPHIL NFR BLD AUTO: 0.8 % — SIGNIFICANT CHANGE UP (ref 0–6)
GLUCOSE BLDC GLUCOMTR-MCNC: 127 MG/DL — HIGH (ref 70–99)
GLUCOSE BLDC GLUCOMTR-MCNC: 153 MG/DL — HIGH (ref 70–99)
GLUCOSE BLDC GLUCOMTR-MCNC: 199 MG/DL — HIGH (ref 70–99)
GLUCOSE BLDC GLUCOMTR-MCNC: 229 MG/DL — HIGH (ref 70–99)
GLUCOSE SERPL-MCNC: 149 MG/DL — HIGH (ref 70–99)
HCT VFR BLD CALC: 33.8 % — LOW (ref 39–50)
HGB BLD-MCNC: 10.7 G/DL — LOW (ref 13–17)
IMM GRANULOCYTES NFR BLD AUTO: 0.7 % — SIGNIFICANT CHANGE UP (ref 0–1.5)
INR BLD: 1.18 RATIO — HIGH (ref 0.88–1.16)
LIDOCAIN IGE QN: 109 U/L — SIGNIFICANT CHANGE UP (ref 73–393)
LYMPHOCYTES # BLD AUTO: 1.34 K/UL — SIGNIFICANT CHANGE UP (ref 1–3.3)
LYMPHOCYTES # BLD AUTO: 10.4 % — LOW (ref 13–44)
MAGNESIUM SERPL-MCNC: 2.7 MG/DL — HIGH (ref 1.6–2.6)
MCHC RBC-ENTMCNC: 29.4 PG — SIGNIFICANT CHANGE UP (ref 27–34)
MCHC RBC-ENTMCNC: 31.7 GM/DL — LOW (ref 32–36)
MCV RBC AUTO: 92.9 FL — SIGNIFICANT CHANGE UP (ref 80–100)
MONOCYTES # BLD AUTO: 1.26 K/UL — HIGH (ref 0–0.9)
MONOCYTES NFR BLD AUTO: 9.8 % — SIGNIFICANT CHANGE UP (ref 2–14)
NEUTROPHILS # BLD AUTO: 10.03 K/UL — HIGH (ref 1.8–7.4)
NEUTROPHILS NFR BLD AUTO: 78.1 % — HIGH (ref 43–77)
NRBC # BLD: 0 /100 WBCS — SIGNIFICANT CHANGE UP (ref 0–0)
PHOSPHATE SERPL-MCNC: 2.5 MG/DL — SIGNIFICANT CHANGE UP (ref 2.5–4.5)
PLATELET # BLD AUTO: 136 K/UL — LOW (ref 150–400)
POTASSIUM SERPL-MCNC: 3.8 MMOL/L — SIGNIFICANT CHANGE UP (ref 3.5–5.3)
POTASSIUM SERPL-SCNC: 3.8 MMOL/L — SIGNIFICANT CHANGE UP (ref 3.5–5.3)
PROT SERPL-MCNC: 6.3 GM/DL — SIGNIFICANT CHANGE UP (ref 6–8.3)
PROTHROM AB SERPL-ACNC: 12.9 SEC — HIGH (ref 9.8–12.7)
RBC # BLD: 3.64 M/UL — LOW (ref 4.2–5.8)
RBC # FLD: 14.1 % — SIGNIFICANT CHANGE UP (ref 10.3–14.5)
SODIUM SERPL-SCNC: 142 MMOL/L — SIGNIFICANT CHANGE UP (ref 135–145)
WBC # BLD: 12.85 K/UL — HIGH (ref 3.8–10.5)
WBC # FLD AUTO: 12.85 K/UL — HIGH (ref 3.8–10.5)

## 2018-10-30 RX ADMIN — Medication 100 MILLIGRAM(S): at 22:01

## 2018-10-30 RX ADMIN — Medication 50 MILLIGRAM(S): at 05:02

## 2018-10-30 RX ADMIN — SODIUM CHLORIDE 75 MILLILITER(S): 9 INJECTION, SOLUTION INTRAVENOUS at 08:31

## 2018-10-30 RX ADMIN — HEPARIN SODIUM 5000 UNIT(S): 5000 INJECTION INTRAVENOUS; SUBCUTANEOUS at 17:52

## 2018-10-30 RX ADMIN — HEPARIN SODIUM 5000 UNIT(S): 5000 INJECTION INTRAVENOUS; SUBCUTANEOUS at 05:01

## 2018-10-30 RX ADMIN — Medication 100 MILLIGRAM(S): at 05:02

## 2018-10-30 RX ADMIN — MEROPENEM 100 MILLIGRAM(S): 1 INJECTION INTRAVENOUS at 05:02

## 2018-10-30 RX ADMIN — Medication 100 MILLIGRAM(S): at 13:03

## 2018-10-30 RX ADMIN — MEROPENEM 100 MILLIGRAM(S): 1 INJECTION INTRAVENOUS at 22:01

## 2018-10-30 RX ADMIN — PANTOPRAZOLE SODIUM 40 MILLIGRAM(S): 20 TABLET, DELAYED RELEASE ORAL at 06:21

## 2018-10-30 RX ADMIN — MEROPENEM 100 MILLIGRAM(S): 1 INJECTION INTRAVENOUS at 14:32

## 2018-10-30 NOTE — CHART NOTE - NSCHARTNOTEFT_GEN_A_CORE
Discussed perc neil tube again with patient and spouse, they still are undecided.  Patient states he feels better  -will f/u in the AM

## 2018-10-30 NOTE — PROGRESS NOTE ADULT - SUBJECTIVE AND OBJECTIVE BOX
SURGERY PROGRESS HPI:  Pt seen and examined at bedside. Denies pain or complaints. Pt denies nausea and vomiting. Pt denies chest pain, SOB, dizziness, fever, chills. States that he will have a family meeting today about possibly having the IR nick cosmee.    Vital Signs Last 24 Hrs  T(C): 36.3 (30 Oct 2018 05:28), Max: 37.2 (29 Oct 2018 17:04)  T(F): 97.3 (30 Oct 2018 05:28), Max: 98.9 (29 Oct 2018 17:04)  HR: 58 (30 Oct 2018 05:28) (58 - 74)  BP: 114/57 (30 Oct 2018 05:28) (114/57 - 151/74)  BP(mean): --  RR: 16 (30 Oct 2018 05:28) (16 - 18)  SpO2: 94% (30 Oct 2018 05:28) (92% - 95%)      PHYSICAL EXAM:    GENERAL: NAD  CHEST/LUNG: Clear to ausculation, bilaterally   HEART: RRR S1S2  ABDOMEN: distended, +BS, soft, mild RUQ and epigastric tenderness, no guarding  EXTREMITIES:  calf soft, non tender b/l    I&O's Detail    28 Oct 2018 07:01  -  29 Oct 2018 07:00  --------------------------------------------------------  IN:    dextrose 5% + sodium chloride 0.45%.: 1750 mL    Solution: 200 mL    Solution: 100 mL    Solution: 100 mL  Total IN: 2150 mL    OUT:    Voided: 1300 mL  Total OUT: 1300 mL    Total NET: 850 mL      29 Oct 2018 07:01  -  30 Oct 2018 06:33  --------------------------------------------------------  IN:    dextrose 5% + sodium chloride 0.45%.: 700 mL  Total IN: 700 mL    OUT:  Total OUT: 0 mL    Total NET: 700 mL      LABS:                        10.8   14.59 )-----------( 129      ( 29 Oct 2018 07:21 )             33.2     10-29    142  |  107  |  28<H>  ----------------------------<  157<H>  4.1   |  24  |  2.27<H>    Ca    7.9<L>      29 Oct 2018 07:21  Phos  2.4     10-29  Mg     2.5     10-29    TPro  6.1  /  Alb  2.5<L>  /  TBili  1.2  /  DBili  x   /  AST  54<H>  /  ALT  116<H>  /  AlkPhos  84  10-29    Culture Results:   No growth to date. (10-28 @ 10:44)  Culture Results:   No growth to date. (10-28 @ 10:42)     NM Hepatobiliary Scan w/wo Gall Bladder (10.29.18 @ 11:14)   IMPRESSION: Abnormal morphine-augmented hepatobiliary scan.  Findings consistent with acute cholecystitis.        Impression: 87M with likely gallstone pancreatitis, resolving with persistent RUQ pain. HIDA+    Plan:  -Recommend IR nick trejo. Pt states that he will have a family meeting about it today.  -NPO, IV hydration  -Antibiotics  -Continue medical management  -Will discuss with attending

## 2018-10-30 NOTE — PHYSICAL THERAPY INITIAL EVALUATION ADULT - CRITERIA FOR SKILLED THERAPEUTIC INTERVENTIONS
impairments found/anticipated discharge recommendation/functional limitations in following categories/home with home PT/risk reduction/prevention/therapy frequency/predicted duration of therapy intervention/anticipated equipment needs at discharge

## 2018-10-30 NOTE — PHYSICAL THERAPY INITIAL EVALUATION ADULT - GAIT DEVIATIONS NOTED, PT EVAL
decreased david/decreased step length/increased time in double stance/decreased velocity of limb motion/decreased stride length

## 2018-10-30 NOTE — PROGRESS NOTE ADULT - SUBJECTIVE AND OBJECTIVE BOX
INTERVAL HPI/OVERNIGHT EVENTS:        REVIEW OF SYSTEMS:  CONSTITUTIONAL: feels  well presents no  complaints    NECK: No pain or stiffnes  RESPIRATORY: No SOB   CARDIOVASCULAR: No chest pain, palpitations, dizziness,   GASTROINTESTINAL:  mild  abdominal pain. No nausea, vomiting,   NEUROLOGICAL: No headaches, no  blurry  vision no  dizziness  SKIN: No itching,   MUSCULOSKELETAL: No pain    MEDICATION:  acetaminophen  Suppository .. 650 milliGRAM(s) Rectal every 6 hours PRN  dextrose 40% Gel 15 Gram(s) Oral once PRN  dextrose 5% + sodium chloride 0.45%. 1000 milliLiter(s) IV Continuous <Continuous>  dextrose 5%. 1000 milliLiter(s) IV Continuous <Continuous>  dextrose 50% Injectable 12.5 Gram(s) IV Push once  dextrose 50% Injectable 25 Gram(s) IV Push once  dextrose 50% Injectable 25 Gram(s) IV Push once  glucagon  Injectable 1 milliGRAM(s) IntraMuscular once PRN  heparin  Injectable 5000 Unit(s) SubCutaneous every 12 hours  influenza   Vaccine 0.5 milliLiter(s) IntraMuscular once  meropenem  IVPB      meropenem  IVPB 1000 milliGRAM(s) IV Intermittent every 8 hours  metoprolol succinate ER 50 milliGRAM(s) Oral daily  metroNIDAZOLE  IVPB      metroNIDAZOLE  IVPB 500 milliGRAM(s) IV Intermittent every 8 hours  pantoprazole    Tablet 40 milliGRAM(s) Oral before breakfast    Vital Signs Last 24 Hrs  T(C): 36.3 (30 Oct 2018 05:28), Max: 37.2 (29 Oct 2018 17:04)  T(F): 97.3 (30 Oct 2018 05:28), Max: 98.9 (29 Oct 2018 17:04)  HR: 58 (30 Oct 2018 05:28) (58 - 74)  BP: 114/57 (30 Oct 2018 05:28) (114/57 - 151/74)  BP(mean): --  RR: 16 (30 Oct 2018 05:28) (16 - 18)  SpO2: 94% (30 Oct 2018 05:28) (92% - 95%)    PHYSICAL EXAM:  GENERAL: NAD, well-groomed, well-developed  EYES:  conjunctiva and sclera clear  ENMT:  Moist mucous membranes,   NECK: Supple, No JVD, Normal thyroid  NERVOUS SYSTEM:  Alert oriented   no  focal  deficits;   CHEST/LUNG: Clear    HEART: Regular rate and rhythm; No murmurs, rubs, or gallops  ABDOMEN: Soft, Nontender, Nondistended; Bowel sounds present  EXTREMITIES:  no  edema no  tenderness  SKIN: No rashes   LABS:                        10.7   12.85 )-----------( 136      ( 30 Oct 2018 07:38 )             33.8     10-29    142  |  107  |  28<H>  ----------------------------<  157<H>  4.1   |  24  |  2.27<H>    Ca    7.9<L>      29 Oct 2018 07:21  Phos  2.4     10-29  Mg     2.5     10-29    TPro  6.1  /  Alb  2.5<L>  /  TBili  1.2  /  DBili  x   /  AST  54<H>  /  ALT  116<H>  /  AlkPhos  84  10-29        CAPILLARY BLOOD GLUCOSE      POCT Blood Glucose.: 153 mg/dL (30 Oct 2018 06:36)  POCT Blood Glucose.: 142 mg/dL (29 Oct 2018 23:52)  POCT Blood Glucose.: 157 mg/dL (29 Oct 2018 16:39)  POCT Blood Glucose.: 148 mg/dL (29 Oct 2018 13:03)      RADIOLOGY & ADDITIONAL TESTS:    Imaging reports  Personally Reviewed:  [x ] YES  [ ] NO    Consultant(s) Notes Reviewed:  [ x] YES  [ ] NO    Care Discussed with Consultants/Other Providers [x ] YES  [ ] NO  Problem/Plan - 1:  ·  Problem: Cholecystitis.  Plan: -IVF  -Broad spectrum antibiotics.further w/u  and  treatment  as per  surgery and  family's  decision    Problem/Plan - 2:  ·  Problem: HTN (hypertension).  Plan: -IV labetolol prn  -Cont to monitor.     Problem/Plan - 3:  ·  Problem: DM II (diabetes mellitus, type II), controlled.  Plan: -Hold glipizide & metformin as patient is NPO  -Monitor FS.  cover  with  insulin  as per  scale    Problem/Plan - 4:  ·  Problem: Dementia.  Plan: -Stable.     Problem/Plan - 5:  ·  Problem: Pacemaker.  Plan: -Stable.     Problem/Plan - 6:  Problem: Prophylactic measure. Plan: -DVT & GI prophylaxis INTERVAL HPI/OVERNIGHT EVENTS:        REVIEW OF SYSTEMS:  CONSTITUTIONAL: feels  well presents no  complaints    NECK: No pain or stiffnes  RESPIRATORY: No SOB   CARDIOVASCULAR: No chest pain, palpitations, dizziness,   GASTROINTESTINAL:  mild  abdominal pain. No nausea, vomiting,   NEUROLOGICAL: No headaches, no  blurry  vision no  dizziness  SKIN: No itching,   MUSCULOSKELETAL: No pain    MEDICATION:  acetaminophen  Suppository .. 650 milliGRAM(s) Rectal every 6 hours PRN  dextrose 40% Gel 15 Gram(s) Oral once PRN  dextrose 5% + sodium chloride 0.45%. 1000 milliLiter(s) IV Continuous <Continuous>  dextrose 5%. 1000 milliLiter(s) IV Continuous <Continuous>  dextrose 50% Injectable 12.5 Gram(s) IV Push once  dextrose 50% Injectable 25 Gram(s) IV Push once  dextrose 50% Injectable 25 Gram(s) IV Push once  glucagon  Injectable 1 milliGRAM(s) IntraMuscular once PRN  heparin  Injectable 5000 Unit(s) SubCutaneous every 12 hours  influenza   Vaccine 0.5 milliLiter(s) IntraMuscular once  meropenem  IVPB      meropenem  IVPB 1000 milliGRAM(s) IV Intermittent every 8 hours  metoprolol succinate ER 50 milliGRAM(s) Oral daily  metroNIDAZOLE  IVPB      metroNIDAZOLE  IVPB 500 milliGRAM(s) IV Intermittent every 8 hours  pantoprazole    Tablet 40 milliGRAM(s) Oral before breakfast    Vital Signs Last 24 Hrs  T(C): 36.3 (30 Oct 2018 05:28), Max: 37.2 (29 Oct 2018 17:04)  T(F): 97.3 (30 Oct 2018 05:28), Max: 98.9 (29 Oct 2018 17:04)  HR: 58 (30 Oct 2018 05:28) (58 - 74)  BP: 114/57 (30 Oct 2018 05:28) (114/57 - 151/74)  BP(mean): --  RR: 16 (30 Oct 2018 05:28) (16 - 18)  SpO2: 94% (30 Oct 2018 05:28) (92% - 95%)    PHYSICAL EXAM:  GENERAL: NAD, well-groomed, well-developed  EYES:  conjunctiva and sclera clear  ENMT:  Moist mucous membranes,   NECK: Supple, No JVD, Normal thyroid  NERVOUS SYSTEM:  Alert oriented   no  focal  deficits;   CHEST/LUNG: Clear    HEART: Regular rate and rhythm; No murmurs, rubs, or gallops  ABDOMEN: Soft, Nontender, Nondistended; Bowel sounds present  EXTREMITIES:  no  edema no  tenderness  SKIN: No rashes   LABS:                        10.7   12.85 )-----------( 136      ( 30 Oct 2018 07:38 )             33.8     10-29    142  |  107  |  28<H>  ----------------------------<  157<H>  4.1   |  24  |  2.27<H>    Ca    7.9<L>      29 Oct 2018 07:21  Phos  2.4     10-29  Mg     2.5     10-29    TPro  6.1  /  Alb  2.5<L>  /  TBili  1.2  /  DBili  x   /  AST  54<H>  /  ALT  116<H>  /  AlkPhos  84  10-29        CAPILLARY BLOOD GLUCOSE      POCT Blood Glucose.: 153 mg/dL (30 Oct 2018 06:36)  POCT Blood Glucose.: 142 mg/dL (29 Oct 2018 23:52)  POCT Blood Glucose.: 157 mg/dL (29 Oct 2018 16:39)  POCT Blood Glucose.: 148 mg/dL (29 Oct 2018 13:03)      RADIOLOGY & ADDITIONAL TESTS:    Imaging reports  Personally Reviewed:  [x ] YES  [ ] NO    Consultant(s) Notes Reviewed:  [ x] YES  [ ] NO    Care Discussed with Consultants/Other Providers [x ] YES  [ ] NO  Problem/Plan - 1:  ·  Problem: Cholecystitis.  Plan: -IVF  -Broad spectrum antibiotics.further w/u  and  treatment  as per  surgery and  family's  decision    Problem/Plan - 2:  ·  Problem: HTN (hypertension).  Plan: -IV labetolol prn  -Cont to monitor.     Problem/Plan - 3:  ·  Problem: DM II (diabetes mellitus, type II), controlled.  Plan: -Hold glipizide & metformin as patient is NPO  -Monitor FS.  cover  with  insulin  as per  scale    Problem/Plan - 4:  ·  Problem: Dementia.  Plan: -Stable.   renal  insufficiency  monitor  with  IV  hydration furhter  w/u  and  treatment  as per clinical  course    Problem/Plan - 5:  ·  Problem: Pacemaker.  Plan: -Stable.     Problem/Plan - 6:  Problem: Prophylactic measure. Plan: -DVT & GI prophylaxis

## 2018-10-31 LAB
ALBUMIN SERPL ELPH-MCNC: 2.2 G/DL — LOW (ref 3.3–5)
ALP SERPL-CCNC: 91 U/L — SIGNIFICANT CHANGE UP (ref 40–120)
ALT FLD-CCNC: 61 U/L — SIGNIFICANT CHANGE UP (ref 12–78)
ANION GAP SERPL CALC-SCNC: 9 MMOL/L — SIGNIFICANT CHANGE UP (ref 5–17)
AST SERPL-CCNC: 25 U/L — SIGNIFICANT CHANGE UP (ref 15–37)
BILIRUB SERPL-MCNC: 0.6 MG/DL — SIGNIFICANT CHANGE UP (ref 0.2–1.2)
BUN SERPL-MCNC: 36 MG/DL — HIGH (ref 7–23)
CALCIUM SERPL-MCNC: 7.8 MG/DL — LOW (ref 8.5–10.1)
CHLORIDE SERPL-SCNC: 109 MMOL/L — HIGH (ref 96–108)
CO2 SERPL-SCNC: 26 MMOL/L — SIGNIFICANT CHANGE UP (ref 22–31)
CREAT SERPL-MCNC: 2.04 MG/DL — HIGH (ref 0.5–1.3)
GLUCOSE BLDC GLUCOMTR-MCNC: 155 MG/DL — HIGH (ref 70–99)
GLUCOSE BLDC GLUCOMTR-MCNC: 158 MG/DL — HIGH (ref 70–99)
GLUCOSE BLDC GLUCOMTR-MCNC: 178 MG/DL — HIGH (ref 70–99)
GLUCOSE BLDC GLUCOMTR-MCNC: 179 MG/DL — HIGH (ref 70–99)
GLUCOSE SERPL-MCNC: 157 MG/DL — HIGH (ref 70–99)
HCT VFR BLD CALC: 33.2 % — LOW (ref 39–50)
HGB BLD-MCNC: 10.8 G/DL — LOW (ref 13–17)
MCHC RBC-ENTMCNC: 30.2 PG — SIGNIFICANT CHANGE UP (ref 27–34)
MCHC RBC-ENTMCNC: 32.5 GM/DL — SIGNIFICANT CHANGE UP (ref 32–36)
MCV RBC AUTO: 92.7 FL — SIGNIFICANT CHANGE UP (ref 80–100)
NRBC # BLD: 0 /100 WBCS — SIGNIFICANT CHANGE UP (ref 0–0)
PLATELET # BLD AUTO: 147 K/UL — LOW (ref 150–400)
POTASSIUM SERPL-MCNC: 3.7 MMOL/L — SIGNIFICANT CHANGE UP (ref 3.5–5.3)
POTASSIUM SERPL-SCNC: 3.7 MMOL/L — SIGNIFICANT CHANGE UP (ref 3.5–5.3)
PROT SERPL-MCNC: 6 GM/DL — SIGNIFICANT CHANGE UP (ref 6–8.3)
RBC # BLD: 3.58 M/UL — LOW (ref 4.2–5.8)
RBC # FLD: 13.9 % — SIGNIFICANT CHANGE UP (ref 10.3–14.5)
SODIUM SERPL-SCNC: 144 MMOL/L — SIGNIFICANT CHANGE UP (ref 135–145)
WBC # BLD: 11.77 K/UL — HIGH (ref 3.8–10.5)
WBC # FLD AUTO: 11.77 K/UL — HIGH (ref 3.8–10.5)

## 2018-10-31 RX ADMIN — SODIUM CHLORIDE 75 MILLILITER(S): 9 INJECTION, SOLUTION INTRAVENOUS at 17:33

## 2018-10-31 RX ADMIN — MEROPENEM 100 MILLIGRAM(S): 1 INJECTION INTRAVENOUS at 05:21

## 2018-10-31 RX ADMIN — MEROPENEM 100 MILLIGRAM(S): 1 INJECTION INTRAVENOUS at 23:16

## 2018-10-31 RX ADMIN — Medication 100 MILLIGRAM(S): at 23:15

## 2018-10-31 RX ADMIN — PANTOPRAZOLE SODIUM 40 MILLIGRAM(S): 20 TABLET, DELAYED RELEASE ORAL at 07:59

## 2018-10-31 RX ADMIN — Medication 100 MILLIGRAM(S): at 13:26

## 2018-10-31 RX ADMIN — Medication 100 MILLIGRAM(S): at 05:21

## 2018-10-31 RX ADMIN — MEROPENEM 100 MILLIGRAM(S): 1 INJECTION INTRAVENOUS at 13:27

## 2018-10-31 RX ADMIN — HEPARIN SODIUM 5000 UNIT(S): 5000 INJECTION INTRAVENOUS; SUBCUTANEOUS at 17:36

## 2018-10-31 NOTE — DIETITIAN INITIAL EVALUATION ADULT. - OTHER INFO
Pt seen for NPO x 4 days. Pt lives with wife & daughter; pt's wife does cooking & food shopping. Pt with hx DM type 2 manages DM with [id number] daily & Glucophage 500mg 2 x day. Pt Pt seen for NPO x 4 days. Pt lives with wife & daughter; pt's wife does cooking & food shopping. Pt with hx DM type 2 manages DM with [id number] daily & Glucophage 500mg 2 x day; SMBG 1 x day. Pt with partial lower dentition & natural upper teeth reports no difficulty chewing or swallowing. Last BM x 1(10/28). Pt seen for NPO x 4 days. Pt lives with wife & daughter; pt's wife does cooking & food shopping. Pt with hx DM type 2 manages DM with [id number] daily & Glucophage 500mg 2 x day; SMBG 1 x day. Pt with partial lower dentition & natural upper teeth reports no difficulty chewing or swallowing. Last BM x 1(10/28). Pt with abdominal pain on admission reports no further abd pain @ this time. Pt with acute cholecystitis pending IR perc neil pending pt & family decision. Pt currently reports good appetite & requesting po @ this time.

## 2018-10-31 NOTE — PROGRESS NOTE ADULT - SUBJECTIVE AND OBJECTIVE BOX
Attempted to obtain consent for neil tube on Monday and today, patient and wife are reluctant and politely refused invasive treatment, despite medical recommendation. He appears better overall, recommend conservative management.

## 2018-10-31 NOTE — DIETITIAN INITIAL EVALUATION ADULT. - SOURCE
Medical chart review/patient/other (specify) other (specify)/Spoke to wife & pt; Medical chart review/family/significant other

## 2018-10-31 NOTE — DIETITIAN INITIAL EVALUATION ADULT. - ORAL INTAKE PTA
good/Pt states eating 3 meals daily;  Breakfast; pt states eating leftovers from Dinner; Roastbeef, chicken or fish, gravy & salad & occasionally cereal & milk Lunch & Dinner; Roast beef, chicken or fish, vegetable & starch Pt states eating 3 meals daily;  Breakfast; pt states eating leftovers from Dinner; Roastbeef, chicken or fish, gravy & salad & occasionally cereal & milk, Coffee with honey or equal Lunch & Dinner; Roast beef, chicken or fish, vegetable & starch/good

## 2018-10-31 NOTE — PROGRESS NOTE ADULT - ATTENDING COMMENTS
I have seen and examined the patient and agree with Katt Aquino the surgical PA's note.  No labs for today so I ordered a CBC for the AM    Dr. Julio Conti contact information [phone]
a/p- gallstone pancreatitis+/- cholecystitis- pain improved  npo, ivf, HIDA, f/u panc enzymes
I have seen and examined the patient and agree with aKtt Aquino the surgical PA's note.  Patient still refusing any intervention other then antibiotics.  To day labs pending.    Dr. Julio Conti contact information [phone]

## 2018-10-31 NOTE — DIETITIAN INITIAL EVALUATION ADULT. - FACTORS AFF FOOD INTAKE
pain/+abdominal pain on admission & Distended abdomen pain/persistent constipation/+abdominal pain on admission & Distended abdomen

## 2018-10-31 NOTE — DIETITIAN INITIAL EVALUATION ADULT. - PROBLEM SELECTOR PLAN 1
-RUQ sonogram ordered and pending  -Surgery consult called by ED; will follow-up recommendations  -Keep NPO  -IVF  -Broad spectrum antibiotics

## 2018-10-31 NOTE — DIETITIAN INITIAL EVALUATION ADULT. - PERTINENT LABORATORY DATA
10-31 Na 144 mmol/L Glu 157 mg/dL<H> K+ 3.7 mmol/L Cr 2.04 mg/dL<H> BUN 36 mg/dL<H> Phos n/a   Alb 2.2 g/dL<L> PAB n/a   Hgb 10.8 g/dL<L> Hct 33.2 %<L> HgA1C n/a    Glucose, Serum: 157 mg/dL <H>, HWSS=724, 127, 199, TlhV4P=4%

## 2018-10-31 NOTE — PROGRESS NOTE ADULT - SUBJECTIVE AND OBJECTIVE BOX
INTERVAL HPI/OVERNIGHT EVENTS:        REVIEW OF SYSTEMS:  CONSTITUTIONAL:  feels  well feels  he's  ready  to  go  honme to  go  home    NECK: No pain or stiffnes  RESPIRATORY: No SOB   CARDIOVASCULAR: No chest pain, palpitations, dizziness,   GASTROINTESTINAL: No abdominal pain. No nausea, vomiting,   NEUROLOGICAL: No headaches, no  blurry  vision no  dizziness  SKIN: No itching,   MUSCULOSKELETAL: No pain    MEDICATION:  acetaminophen  Suppository .. 650 milliGRAM(s) Rectal every 6 hours PRN  dextrose 40% Gel 15 Gram(s) Oral once PRN  dextrose 5% + sodium chloride 0.45%. 1000 milliLiter(s) IV Continuous <Continuous>  dextrose 5%. 1000 milliLiter(s) IV Continuous <Continuous>  dextrose 50% Injectable 12.5 Gram(s) IV Push once  dextrose 50% Injectable 25 Gram(s) IV Push once  dextrose 50% Injectable 25 Gram(s) IV Push once  glucagon  Injectable 1 milliGRAM(s) IntraMuscular once PRN  heparin  Injectable 5000 Unit(s) SubCutaneous every 12 hours  influenza   Vaccine 0.5 milliLiter(s) IntraMuscular once  meropenem  IVPB      meropenem  IVPB 1000 milliGRAM(s) IV Intermittent every 8 hours  metoprolol succinate ER 50 milliGRAM(s) Oral daily  metroNIDAZOLE  IVPB      metroNIDAZOLE  IVPB 500 milliGRAM(s) IV Intermittent every 8 hours  pantoprazole    Tablet 40 milliGRAM(s) Oral before breakfast    Vital Signs Last 24 Hrs  T(C): 37.1 (31 Oct 2018 05:53), Max: 37.3 (31 Oct 2018 00:02)  T(F): 98.7 (31 Oct 2018 05:53), Max: 99.1 (31 Oct 2018 00:02)  HR: 60 (31 Oct 2018 05:53) (60 - 68)  BP: 142/41 (31 Oct 2018 05:53) (142/41 - 169/68)  BP(mean): --  RR: 18 (31 Oct 2018 05:53) (17 - 18)  SpO2: 100% (31 Oct 2018 05:53) (96% - 100%)    PHYSICAL EXAM:  GENERAL: NAD, well-groomed, well-developed  EYES:  conjunctiva and sclera clear  ENMT:  Moist mucous membranes,   NECK: Supple, No JVD, Normal thyroid  NERVOUS SYSTEM:  Alert oriented  x2 no  focal  deficits;   CHEST/LUNG: Clear    HEART: Regular rate and rhythm; No murmurs, rubs, or gallops  ABDOMEN: Soft, Nontender, Nondistended; Bowel sounds present  EXTREMITIES:  no  edema no  tenderness  SKIN: No rashes   LABS:                        10.8   11.77 )-----------( 147      ( 31 Oct 2018 07:31 )             33.2     10-31    144  |  109<H>  |  36<H>  ----------------------------<  157<H>  3.7   |  26  |  2.04<H>    Ca    7.8<L>      31 Oct 2018 07:31  Phos  2.5     10-30  Mg     2.7     10-30    TPro  6.0  /  Alb  2.2<L>  /  TBili  0.6  /  DBili  x   /  AST  25  /  ALT  61  /  AlkPhos  91  10-31    PT/INR - ( 30 Oct 2018 07:38 )   PT: 12.9 sec;   INR: 1.18 ratio         PTT - ( 30 Oct 2018 07:38 )  PTT:31.1 sec    CAPILLARY BLOOD GLUCOSE      POCT Blood Glucose.: 155 mg/dL (31 Oct 2018 07:31)  POCT Blood Glucose.: 127 mg/dL (30 Oct 2018 21:30)  POCT Blood Glucose.: 199 mg/dL (30 Oct 2018 17:09)  POCT Blood Glucose.: 229 mg/dL (30 Oct 2018 11:55)      RADIOLOGY & ADDITIONAL TESTS:    Imaging reports  Personally Reviewed:  [x ] YES  [ ] NO    Consultant(s) Notes Reviewed:  [ x] YES  [ ] NO    Care Discussed with Consultants/Other Providers [x ] YES  [ ] NO  Problem/Plan - 1:  ·  Problem: Cholecystitis.  Plan: -IVF  -Broad spectrum antibiotics. further w/u  and  treatment  as per  surgery and  family's  decision IR  to  speak  to  family  as  to  further  therapy patient  with  mildly  impaired  cognition.  discussed  with  IR  Problem/Plan - 2:  ·  Problem: HTN (hypertension).  Plan: -IV labetolol prn  -Cont to monitor.     Problem/Plan - 3:  ·  Problem: DM II (diabetes mellitus, type II), controlled.  Plan: -Hold glipizide & metformin as patient is NPO  -Monitor FS.  cover  with  insulin  as per  scale    Problem/Plan - 4:  ·  Problem: Dementia.  Plan: -Stable.   renal  insufficiency  monitor  with  IV  hydration furhter  w/u  and  treatment  as per clinical  course    Problem/Plan - 5:  ·  Problem: Pacemaker.  Plan: -Stable.     Problem/Plan - 6:  Problem: Prophylactic measure. Plan: -DVT & GI prophylaxis

## 2018-10-31 NOTE — DIETITIAN INITIAL EVALUATION ADULT. - PERTINENT MEDS FT
acetaminophen  Suppository .. PRN  dextrose 40% Gel PRN  dextrose 5% + sodium chloride 0.45%.  dextrose 5%.  dextrose 50% Injectable  dextrose 50% Injectable  dextrose 50% Injectable  glucagon  Injectable PRN  heparin  Injectable  influenza   Vaccine  meropenem  IVPB  meropenem  IVPB  metoprolol succinate ER  metroNIDAZOLE  IVPB  metroNIDAZOLE  IVPB  pantoprazole    Tablet

## 2018-10-31 NOTE — DIETITIAN INITIAL EVALUATION ADULT. - NS AS NUTRI INTERV MEALS SNACK
Fat - modified diet/Mineral - modified diet/Carbohydrate - modified diet/Specific foods/beverages or groups/Other (specify)/Recommend adv po diet when medically feasible Clear liquids to full liquids/CCHO to CCHO with snack/Dash/TLC

## 2018-10-31 NOTE — PROGRESS NOTE ADULT - SUBJECTIVE AND OBJECTIVE BOX
SURGERY PROGRESS HPI:  Pt seen and examined at bedside. Denies pain or complaints. Pt denies nausea and vomiting. Pt denies chest pain, SOB, dizziness, fever, chills. States that he does not want an IR perc neil. He discussed this with his family yesterday.     Vital Signs Last 24 Hrs  T(C): 37.1 (31 Oct 2018 05:53), Max: 37.3 (31 Oct 2018 00:02)  T(F): 98.7 (31 Oct 2018 05:53), Max: 99.1 (31 Oct 2018 00:02)  HR: 60 (31 Oct 2018 05:53) (60 - 68)  BP: 142/41 (31 Oct 2018 05:53) (142/41 - 169/68)  RR: 18 (31 Oct 2018 05:53) (17 - 18)  SpO2: 100% (31 Oct 2018 05:53) (96% - 100%)      PHYSICAL EXAM:    GENERAL: NAD  CHEST/LUNG: Clear to ausculation, bilaterally   HEART: RRR S1S2  ABDOMEN: softly distended, +BS, soft, non tender, no guarding  EXTREMITIES:  calf soft, non tender b/l    I&O's Detail    29 Oct 2018 07:01  -  30 Oct 2018 07:00  --------------------------------------------------------  IN:    dextrose 5% + sodium chloride 0.45%.: 700 mL  Total IN: 700 mL    OUT:  Total OUT: 0 mL    Total NET: 700 mL      30 Oct 2018 07:01  -  31 Oct 2018 06:25  --------------------------------------------------------  IN:    dextrose 5% + sodium chloride 0.45%.: 900 mL  Total IN: 900 mL    OUT:    Voided: 1400 mL  Total OUT: 1400 mL    Total NET: -500 mL    LABS:                        10.7   12.85 )-----------( 136      ( 30 Oct 2018 07:38 )             33.8     10-30    142  |  107  |  33<H>  ----------------------------<  149<H>  3.8   |  24  |  2.29<H>    Ca    7.9<L>      30 Oct 2018 07:38  Phos  2.5     10-30  Mg     2.7     10-30    TPro  6.3  /  Alb  2.4<L>  /  TBili  0.7  /  DBili  .33<H>  /  AST  40<H>  /  ALT  88<H>  /  AlkPhos  92  10-30    PT/INR - ( 30 Oct 2018 07:38 )   PT: 12.9 sec;   INR: 1.18 ratio    PTT - ( 30 Oct 2018 07:38 )  PTT:31.1 sec    Culture Results:   No growth to date. (10-28 @ 10:44)  Culture Results:   No growth to date. (10-28 @ 10:42)      NM Hepatobiliary Scan w/wo Gall Bladder (10.29.18 @ 11:14)   IMPRESSION: Abnormal morphine-augmented hepatobiliary scan.  Findings consistent with acute cholecystitis.    Impression: 87M with likely gallstone pancreatitis, resolving with persistent RUQ pain. HIDA+    Plan:  -Recommend IR perc neil. Pt currently refusing treatment. Discussed the risks and benefits with patient. Pt says that he will talk to his family again.  -NPO, IV hydration  -Antibiotics  -Continue medical management  -Will discuss with attending

## 2018-11-01 DIAGNOSIS — E44.0 MODERATE PROTEIN-CALORIE MALNUTRITION: ICD-10-CM

## 2018-11-01 DIAGNOSIS — Z71.89 OTHER SPECIFIED COUNSELING: ICD-10-CM

## 2018-11-01 LAB
ALBUMIN SERPL ELPH-MCNC: 2.1 G/DL — LOW (ref 3.3–5)
ALP SERPL-CCNC: 83 U/L — SIGNIFICANT CHANGE UP (ref 40–120)
ALT FLD-CCNC: 45 U/L — SIGNIFICANT CHANGE UP (ref 12–78)
ANION GAP SERPL CALC-SCNC: 9 MMOL/L — SIGNIFICANT CHANGE UP (ref 5–17)
AST SERPL-CCNC: 25 U/L — SIGNIFICANT CHANGE UP (ref 15–37)
BILIRUB SERPL-MCNC: 0.7 MG/DL — SIGNIFICANT CHANGE UP (ref 0.2–1.2)
BUN SERPL-MCNC: 31 MG/DL — HIGH (ref 7–23)
CALCIUM SERPL-MCNC: 7.1 MG/DL — LOW (ref 8.5–10.1)
CHLORIDE SERPL-SCNC: 109 MMOL/L — HIGH (ref 96–108)
CO2 SERPL-SCNC: 26 MMOL/L — SIGNIFICANT CHANGE UP (ref 22–31)
CREAT SERPL-MCNC: 1.86 MG/DL — HIGH (ref 0.5–1.3)
GLUCOSE BLDC GLUCOMTR-MCNC: 156 MG/DL — HIGH (ref 70–99)
GLUCOSE BLDC GLUCOMTR-MCNC: 166 MG/DL — HIGH (ref 70–99)
GLUCOSE BLDC GLUCOMTR-MCNC: 171 MG/DL — HIGH (ref 70–99)
GLUCOSE BLDC GLUCOMTR-MCNC: 195 MG/DL — HIGH (ref 70–99)
GLUCOSE SERPL-MCNC: 158 MG/DL — HIGH (ref 70–99)
HCT VFR BLD CALC: 33 % — LOW (ref 39–50)
HGB BLD-MCNC: 10.6 G/DL — LOW (ref 13–17)
MCHC RBC-ENTMCNC: 29.8 PG — SIGNIFICANT CHANGE UP (ref 27–34)
MCHC RBC-ENTMCNC: 32.1 GM/DL — SIGNIFICANT CHANGE UP (ref 32–36)
MCV RBC AUTO: 92.7 FL — SIGNIFICANT CHANGE UP (ref 80–100)
NRBC # BLD: 0 /100 WBCS — SIGNIFICANT CHANGE UP (ref 0–0)
PLATELET # BLD AUTO: 152 K/UL — SIGNIFICANT CHANGE UP (ref 150–400)
POTASSIUM SERPL-MCNC: 3.6 MMOL/L — SIGNIFICANT CHANGE UP (ref 3.5–5.3)
POTASSIUM SERPL-SCNC: 3.6 MMOL/L — SIGNIFICANT CHANGE UP (ref 3.5–5.3)
PROT SERPL-MCNC: 5.9 GM/DL — LOW (ref 6–8.3)
RBC # BLD: 3.56 M/UL — LOW (ref 4.2–5.8)
RBC # FLD: 13.7 % — SIGNIFICANT CHANGE UP (ref 10.3–14.5)
SODIUM SERPL-SCNC: 144 MMOL/L — SIGNIFICANT CHANGE UP (ref 135–145)
WBC # BLD: 10.08 K/UL — SIGNIFICANT CHANGE UP (ref 3.8–10.5)
WBC # FLD AUTO: 10.08 K/UL — SIGNIFICANT CHANGE UP (ref 3.8–10.5)

## 2018-11-01 PROCEDURE — 99498 ADVNCD CARE PLAN ADDL 30 MIN: CPT

## 2018-11-01 PROCEDURE — 99497 ADVNCD CARE PLAN 30 MIN: CPT

## 2018-11-01 RX ADMIN — PANTOPRAZOLE SODIUM 40 MILLIGRAM(S): 20 TABLET, DELAYED RELEASE ORAL at 07:57

## 2018-11-01 RX ADMIN — SODIUM CHLORIDE 75 MILLILITER(S): 9 INJECTION, SOLUTION INTRAVENOUS at 23:33

## 2018-11-01 RX ADMIN — Medication 100 MILLIGRAM(S): at 13:17

## 2018-11-01 RX ADMIN — Medication 100 MILLIGRAM(S): at 21:59

## 2018-11-01 RX ADMIN — Medication 100 MILLIGRAM(S): at 06:26

## 2018-11-01 RX ADMIN — HEPARIN SODIUM 5000 UNIT(S): 5000 INJECTION INTRAVENOUS; SUBCUTANEOUS at 06:24

## 2018-11-01 RX ADMIN — MEROPENEM 100 MILLIGRAM(S): 1 INJECTION INTRAVENOUS at 13:17

## 2018-11-01 RX ADMIN — HEPARIN SODIUM 5000 UNIT(S): 5000 INJECTION INTRAVENOUS; SUBCUTANEOUS at 17:44

## 2018-11-01 RX ADMIN — MEROPENEM 100 MILLIGRAM(S): 1 INJECTION INTRAVENOUS at 22:00

## 2018-11-01 RX ADMIN — SODIUM CHLORIDE 75 MILLILITER(S): 9 INJECTION, SOLUTION INTRAVENOUS at 06:20

## 2018-11-01 RX ADMIN — MEROPENEM 100 MILLIGRAM(S): 1 INJECTION INTRAVENOUS at 06:23

## 2018-11-01 NOTE — PROGRESS NOTE ADULT - SUBJECTIVE AND OBJECTIVE BOX
Patient continues to improve: has no abdominal pain opr tenderness and WBC and LFTYs have normalized on antibiotic therapy. Has refused percutaneous cholecystostomy and gallstones remain an unresolved p[konstantin.

## 2018-11-01 NOTE — CONSULT NOTE ADULT - SUBJECTIVE AND OBJECTIVE BOX
87M states that he has had abdominal pain since last Monday (5 days ago) but only told his wife about it this morning at 4am when the pain became severe. Located in his RUQ. Associated with nausea and gagging, no emesis. Last BM was Monday when the pain started. Passing minimal flatus since then. No HA, SOB, CP.    MHx: DM, HTN, HLD, PPM 10 years ago, battery replaced 1.5 years ago.  SHx: PPM, No abdominal surgery  Meds: In chart  SOC: , Quit smoking 40 years ago, No ETOH  ALL: PCN    AAO in NAD  Lungs CTAB  RRR  Abdomen soft, markedly distended (patient states he does not feel distended and that this is his normal size). RUQ TTP without rebound/guarding. No epigastric tenderness  Calves soft, non-tender.    WBC 11  Liver enzymes elevated  Lipase 809    CT - Pancreas normal appearing, pericholecystic inflammatino  U/S	: Pending
HPI:  88 y/o M with PMHx of DM, HTN, s/p PPM, dyslipidemia, and dementia presents complaining of generalized abdominal pain since this am.  States abdominal pain is crampy in nature, 7-8/10 severity, and without radiation to flank or back.  Patient states his last BM was on Monday 10/22/18.  Reports h/o constipation but states he has never gone longer than 3 days without a BM.  Reports feeling "tired" for the past few days but denies fever, chills, CP, palpitations, SOB, diarrhea or urinary symptoms.  Reports nausea this am but denies vomiting.  Denies recent travel or sick contacts. Palliative care consult called for Advance age and refusing medical interventions. Goals of care conversation and advance care planning.    PERTINENT PMH REVIEWED:  [x ] YES [ ] NO           SOCIAL HISTORY:  Significant other/partner:  [ ] YES  [ ] NO            Children:  [x ] YES  [ ] NO                   Gnosticist/Spirituality:  Substance hx:  [ ] YES   [ ] NO           Tobacco hx:  [ ] YES  [ ] NO             Alcohol hx: [ ] YES  [ ] NO        Home Opioid hx:  [ ] YES  [ ] NO   Living Situation: [x ] Home  [ ] Long term care  [ ] Rehab    FAMILY HISTORY:  No pertinent family history in first degree relatives    [x ] Family history non contributory     BASELINE ADLs (prior to admission):  Independent [ ] moderately [ ] fully   Dependent   [x ] moderately [ ] fully    Code Status: FULL CODE                     MOLST  [ ] YES [X ] NO    Living Will  [ ] YES [X ] NO    Health Care Proxy [ ] YES  [X ] NO      [ ] Surrogate  [ ] HCP  [ ] Guardian:   Mitzy Pérez                                                                Phone#:    Allergies  penicillin (Hives)    Intolerances    MEDICATIONS  (STANDING):  dextrose 5% + sodium chloride 0.45%. 1000 milliLiter(s) (75 mL/Hr) IV Continuous <Continuous>  dextrose 5%. 1000 milliLiter(s) (50 mL/Hr) IV Continuous <Continuous>  dextrose 50% Injectable 12.5 Gram(s) IV Push once  dextrose 50% Injectable 25 Gram(s) IV Push once  dextrose 50% Injectable 25 Gram(s) IV Push once  heparin  Injectable 5000 Unit(s) SubCutaneous every 12 hours  influenza   Vaccine 0.5 milliLiter(s) IntraMuscular once  meropenem  IVPB      meropenem  IVPB 1000 milliGRAM(s) IV Intermittent every 8 hours  metoprolol succinate ER 50 milliGRAM(s) Oral daily  metroNIDAZOLE  IVPB      metroNIDAZOLE  IVPB 500 milliGRAM(s) IV Intermittent every 8 hours  pantoprazole    Tablet 40 milliGRAM(s) Oral before breakfast    MEDICATIONS  (PRN):  acetaminophen  Suppository .. 650 milliGRAM(s) Rectal every 6 hours PRN Temp greater or equal to 38C (100.4F)  dextrose 40% Gel 15 Gram(s) Oral once PRN Blood Glucose LESS THAN 70 milliGRAM(s)/deciliter  glucagon  Injectable 1 milliGRAM(s) IntraMuscular once PRN Glucose LESS THAN 70 milligrams/deciliter    PRESENT SYMPTOMS:  Source: [ ] Patient   [x ] Family   [ ] Team     Pain: [ ] YES [x ] NO  Onset:                    Location:                          Duration:                 Character:            Aggravating factors:                        Relieving factors:    Radiation:              Timing:                             Severity:      Dyspnea: [ ] YES [x ] NO - Mild [ ]  Moderate [ ]  Severe [ ]    Anxiety: [x ] YES [ ] NO  Fatigue: [x ] YES [ ] NO   Nausea: [ ] YES [x ] NO  Loss of appetite: [ ] YES [x ] NO   Constipation: [ ] YES [x ] NO     Other Symptoms:  [x ] All other review of systems negative   [ ] Unable to obtain due to poor mentation     Does patient meet criteria for Severe Protein Calorie Malnutrition?  Yes [ ]  No [x ]  PPSV 30% or below [ ]  Anasarca [ ]  Albumin < 2 [ ] Catabolic State [ ] Poor nutritional intake [x ] Significant weight loss [ ]      Palliative Performance Status Version 2:  40 %  ECOG - 4    Vital Signs Last 24 Hrs  T(C): 36.4 (01 Nov 2018 11:23), Max: 36.9 (31 Oct 2018 23:26)  T(F): 97.5 (01 Nov 2018 11:23), Max: 98.5 (31 Oct 2018 23:26)  HR: 74 (01 Nov 2018 11:23) (59 - 74)  BP: 124/47 (01 Nov 2018 11:23) (124/47 - 169/55)  BP(mean): --  RR: 17 (01 Nov 2018 11:23) (16 - 18)  SpO2: 97% (01 Nov 2018 11:23) (95% - 97%)    Physical Exam:    General: [ ] Alert,  A&O x 2-3    [ x] lethargic   [ ] Agitated   [ ] Cachexia   HEENT: [ ] Normal   [x ] Dry mouth   [ ] ET Tube    [ ] Trach   Lungs: [ ] Clear [x ] Rhonchi  [ ] Crackles [ ] Wheezing [ ] Tachypnea  [ ] Audible excessive secretions    Cardiovascular:  [x ] Regular rate and rhythm  [ ] Irregular [ ] Tachycardia   [ ] Bradycardia   Abdomen: [x ] Soft  [x ] Distended  [x ] +BS  [ ] Non tender [ ] Tender  [ ]PEG   [ ] NGT   Last BM:     Genitourinary: [x ] Normal [ ] Incontinent   [ ] Oliguria/Anuria   [ ] Miner  Musculoskeletal:  [ ] Normal   [x ] Generalized weakness  [ ] Bedbound  [ ] Edema   Neurological: [ ] No focal deficits  [x ] Cognitive impairment     Skin: [ x] Normal   [ ] Pressure ulcers     LABS:                        10.6   10.08 )-----------( 152      ( 01 Nov 2018 06:30 )             33.0     11-01    144  |  109<H>  |  31<H>  ----------------------------<  158<H>  3.6   |  26  |  1.86<H>    Ca    7.1<L>      01 Nov 2018 06:30    TPro  5.9<L>  /  Alb  2.1<L>  /  TBili  0.7  /  DBili  x   /  AST  25  /  ALT  45  /  AlkPhos  83  11-01    I&O's Summary    31 Oct 2018 07:01  -  01 Nov 2018 07:00  --------------------------------------------------------  IN: 1800 mL / OUT: 1000 mL / NET: 800 mL    01 Nov 2018 07:01  -  01 Nov 2018 17:12  --------------------------------------------------------  IN: 0 mL / OUT: 500 mL / NET: -500 mL    RADIOLOGY & ADDITIONAL STUDIES:    Referrals:  Hospice [ ]   Chaplaincy [x ]    Child Life [ ]   Social Work [x ]   Case Management [ ]   Holistic Therapy [ ]

## 2018-11-01 NOTE — CONSULT NOTE ADULT - CONSULT REASON
Abdominal pain
Advance age and refusing medical interventions. Goals of care conversation and advance care planning.

## 2018-11-01 NOTE — CONSULT NOTE ADULT - ASSESSMENT
87M with clinical/laboratory signs of gallstone pancreatitis, although pancreatitis without cholecystitis is also on differential.    1. Admitted to medicine  2. ABX  3. f/u ultrasound.  4. Pain control  5. Discussed with Dr. Juarez.
Family meeting on: DATE: 11/1/18 Met and spoke to patient and his wife La Nena Pérez and discussed goals of care and advance care planning. Educated on MOLST Form and HCP patient and the wife are not ready to complete any advance care plans. MOLST form and HCP form provided.  Plan: When medically stable D/C home.  Recommendations: consider DNR/ DNI, COMFORT CARE.AND PAIN AND SYMPTOM MANAGEMENT WITH HOSPICE CARE.

## 2018-11-01 NOTE — PROGRESS NOTE ADULT - SUBJECTIVE AND OBJECTIVE BOX
INTERVAL HPI/OVERNIGHT EVENTS:        REVIEW OF SYSTEMS:  CONSTITUTIONAL: feels  well  wants  to  go  home     NECK: No pain or stiffnes  RESPIRATORY: No SOB   CARDIOVASCULAR: No chest pain, palpitations, dizziness,   GASTROINTESTINAL: No abdominal pain. No nausea, vomiting,   NEUROLOGICAL: No headaches, no  blurry  vision no  dizziness  SKIN: No itching,   MUSCULOSKELETAL: No pain    MEDICATION:  acetaminophen  Suppository .. 650 milliGRAM(s) Rectal every 6 hours PRN  dextrose 40% Gel 15 Gram(s) Oral once PRN  dextrose 5% + sodium chloride 0.45%. 1000 milliLiter(s) IV Continuous <Continuous>  dextrose 5%. 1000 milliLiter(s) IV Continuous <Continuous>  dextrose 50% Injectable 12.5 Gram(s) IV Push once  dextrose 50% Injectable 25 Gram(s) IV Push once  dextrose 50% Injectable 25 Gram(s) IV Push once  glucagon  Injectable 1 milliGRAM(s) IntraMuscular once PRN  heparin  Injectable 5000 Unit(s) SubCutaneous every 12 hours  influenza   Vaccine 0.5 milliLiter(s) IntraMuscular once  meropenem  IVPB      meropenem  IVPB 1000 milliGRAM(s) IV Intermittent every 8 hours  metoprolol succinate ER 50 milliGRAM(s) Oral daily  metroNIDAZOLE  IVPB      metroNIDAZOLE  IVPB 500 milliGRAM(s) IV Intermittent every 8 hours  pantoprazole    Tablet 40 milliGRAM(s) Oral before breakfast    Vital Signs Last 24 Hrs  T(C): 36.3 (01 Nov 2018 05:29), Max: 36.9 (31 Oct 2018 23:26)  T(F): 97.4 (01 Nov 2018 05:29), Max: 98.5 (31 Oct 2018 23:26)  HR: 59 (01 Nov 2018 05:29) (59 - 70)  BP: 162/51 (01 Nov 2018 05:29) (122/65 - 169/55)  BP(mean): --  RR: 16 (01 Nov 2018 05:29) (16 - 18)  SpO2: 95% (01 Nov 2018 05:29) (95% - 99%)    PHYSICAL EXAM:  GENERAL: NAD, well-groomed, well-developed  EYES:  conjunctiva and sclera clear  ENMT:  Moist mucous membranes,   NECK: Supple, No JVD, Normal thyroid  NERVOUS SYSTEM:  Alert oriented x2  no  focal  deficits;   CHEST/LUNG: Clear    HEART: Regular rate and rhythm; No murmurs, rubs, or gallops  ABDOMEN: Soft, Nontender, Nondistended; Bowel sounds present  EXTREMITIES:  no  edema no  tenderness  SKIN: No rashes   LABS:                        10.6   10.08 )-----------( 152      ( 01 Nov 2018 06:30 )             33.0     11-01    144  |  109<H>  |  31<H>  ----------------------------<  158<H>  3.6   |  26  |  1.86<H>    Ca    7.1<L>      01 Nov 2018 06:30    TPro  5.9<L>  /  Alb  2.1<L>  /  TBili  0.7  /  DBili  x   /  AST  25  /  ALT  45  /  AlkPhos  83  11-01        CAPILLARY BLOOD GLUCOSE      POCT Blood Glucose.: 171 mg/dL (01 Nov 2018 07:42)  POCT Blood Glucose.: 158 mg/dL (31 Oct 2018 23:27)  POCT Blood Glucose.: 178 mg/dL (31 Oct 2018 16:08)  POCT Blood Glucose.: 179 mg/dL (31 Oct 2018 11:12)      RADIOLOGY & ADDITIONAL TESTS:    Imaging reports  Personally Reviewed:  [x ] YES  [ ] NO    Consultant(s) Notes Reviewed:  [ x] YES  [ ] NO    Care Discussed with Consultants/Other Providers [x ] YES  [ ] NO  ·  Problem: Cholecystitis.  Plan: -IVF  -Broad spectrum antibiotics. patient  refuses  IR  intervention  agrees to  stay  24  more  hours  will  start  oral  doet  continue  AB  recheck  labs  discharge  in  AM  if  stable  Problem/Plan - 2:  ·  Problem: HTN (hypertension).  Plan: -  -Cont to monitor.     Problem/Plan - 3:  ·  Problem: DM II (diabetes mellitus, type II), controlled.  Plan: -restart  metformin  -Monitor FS.  cover  with  insulin  as per  scale    Problem/Plan - 4:  ·  Problem: Dementia.  Plan: -Stable.   renal  insufficiency  monitor  with  IV  hydration furhter  w/u  and  treatment  as per clinical  course    Problem/Plan - 5:  ·  Problem: Pacemaker.  Plan: -Stable.     Problem/Plan - 6:  Problem: Prophylactic measure. Plan: -DVT & GI prophylaxis INTERVAL HPI/OVERNIGHT EVENTS:        REVIEW OF SYSTEMS:  CONSTITUTIONAL: feels  well  wants  to  go  home     NECK: No pain or stiffnes  RESPIRATORY: No SOB   CARDIOVASCULAR: No chest pain, palpitations, dizziness,   GASTROINTESTINAL: No abdominal pain. No nausea, vomiting,   NEUROLOGICAL: No headaches, no  blurry  vision no  dizziness  SKIN: No itching,   MUSCULOSKELETAL: No pain    MEDICATION:  acetaminophen  Suppository .. 650 milliGRAM(s) Rectal every 6 hours PRN  dextrose 40% Gel 15 Gram(s) Oral once PRN  dextrose 5% + sodium chloride 0.45%. 1000 milliLiter(s) IV Continuous <Continuous>  dextrose 5%. 1000 milliLiter(s) IV Continuous <Continuous>  dextrose 50% Injectable 12.5 Gram(s) IV Push once  dextrose 50% Injectable 25 Gram(s) IV Push once  dextrose 50% Injectable 25 Gram(s) IV Push once  glucagon  Injectable 1 milliGRAM(s) IntraMuscular once PRN  heparin  Injectable 5000 Unit(s) SubCutaneous every 12 hours  influenza   Vaccine 0.5 milliLiter(s) IntraMuscular once  meropenem  IVPB      meropenem  IVPB 1000 milliGRAM(s) IV Intermittent every 8 hours  metoprolol succinate ER 50 milliGRAM(s) Oral daily  metroNIDAZOLE  IVPB      metroNIDAZOLE  IVPB 500 milliGRAM(s) IV Intermittent every 8 hours  pantoprazole    Tablet 40 milliGRAM(s) Oral before breakfast    Vital Signs Last 24 Hrs  T(C): 36.3 (01 Nov 2018 05:29), Max: 36.9 (31 Oct 2018 23:26)  T(F): 97.4 (01 Nov 2018 05:29), Max: 98.5 (31 Oct 2018 23:26)  HR: 59 (01 Nov 2018 05:29) (59 - 70)  BP: 162/51 (01 Nov 2018 05:29) (122/65 - 169/55)  BP(mean): --  RR: 16 (01 Nov 2018 05:29) (16 - 18)  SpO2: 95% (01 Nov 2018 05:29) (95% - 99%)    PHYSICAL EXAM:  GENERAL: NAD, well-groomed, well-developed  EYES:  conjunctiva and sclera clear  ENMT:  Moist mucous membranes,   NECK: Supple, No JVD, Normal thyroid  NERVOUS SYSTEM:  Alert oriented x2  no  focal  deficits;   CHEST/LUNG: Clear    HEART: Regular rate and rhythm; No murmurs, rubs, or gallops  ABDOMEN: Soft, Nontender, Nondistended; Bowel sounds present  EXTREMITIES:  no  edema no  tenderness  SKIN: No rashes   LABS:                        10.6   10.08 )-----------( 152      ( 01 Nov 2018 06:30 )             33.0     11-01    144  |  109<H>  |  31<H>  ----------------------------<  158<H>  3.6   |  26  |  1.86<H>    Ca    7.1<L>      01 Nov 2018 06:30    TPro  5.9<L>  /  Alb  2.1<L>  /  TBili  0.7  /  DBili  x   /  AST  25  /  ALT  45  /  AlkPhos  83  11-01        CAPILLARY BLOOD GLUCOSE      POCT Blood Glucose.: 171 mg/dL (01 Nov 2018 07:42)  POCT Blood Glucose.: 158 mg/dL (31 Oct 2018 23:27)  POCT Blood Glucose.: 178 mg/dL (31 Oct 2018 16:08)  POCT Blood Glucose.: 179 mg/dL (31 Oct 2018 11:12)      RADIOLOGY & ADDITIONAL TESTS:    Imaging reports  Personally Reviewed:  [x ] YES  [ ] NO    Consultant(s) Notes Reviewed:  [ x] YES  [ ] NO    Care Discussed with Consultants/Other Providers [x ] YES  [ ] NO  ·  Problem: Cholecystitis.  Plan: -IVF  -Broad spectrum antibiotics. patient  refuses  IR  intervention  agrees to  stay  24  more  hours  will  start  oral  doet  continue  AB  recheck  labs  discharge  in  AM  if  stable  Problem/Plan - 2:  ·  Problem: HTN (hypertension).  Plan: -  -Cont to monitor.     Problem/Plan - 3:  ·  Problem: DM II (diabetes mellitus, type II), controlled.  Plan: -continue  with  insulin  coverage  will  add  glymepiride  as  out patient  -Monitor FS.  cover  with  insulin  as per  scale    Problem/Plan - 4:  ·  Problem: Dementia.  Plan: -Stable.   renal  insufficiency  monitor  with  IV  hydration furhter  w/u  and  treatment  as per clinical  course    Problem/Plan - 5:  ·  Problem: Pacemaker.  Plan: -Stable.     Problem/Plan - 6:  Problem: Prophylactic measure. Plan: -DVT & GI prophylaxis

## 2018-11-01 NOTE — PROGRESS NOTE ADULT - ASSESSMENT
Positive clinical response to antibiotic therapy rendering percutaneous drainage unnecessary at this point. However, he is still at obvious risk for recurrent cholecystitis and should consider an elective cholecystectomy undser idealized conditions. OK to advance diet. Will no longer follow on a daily basis. Please reconsult as required. Thank you.

## 2018-11-02 ENCOUNTER — TRANSCRIPTION ENCOUNTER (OUTPATIENT)
Age: 83
End: 2018-11-02

## 2018-11-02 VITALS
SYSTOLIC BLOOD PRESSURE: 147 MMHG | RESPIRATION RATE: 18 BRPM | OXYGEN SATURATION: 93 % | TEMPERATURE: 99 F | DIASTOLIC BLOOD PRESSURE: 81 MMHG | HEART RATE: 82 BPM

## 2018-11-02 LAB
ALBUMIN SERPL ELPH-MCNC: 2.1 G/DL — LOW (ref 3.3–5)
ALP SERPL-CCNC: 94 U/L — SIGNIFICANT CHANGE UP (ref 40–120)
ALT FLD-CCNC: 39 U/L — SIGNIFICANT CHANGE UP (ref 12–78)
ANION GAP SERPL CALC-SCNC: 8 MMOL/L — SIGNIFICANT CHANGE UP (ref 5–17)
AST SERPL-CCNC: 25 U/L — SIGNIFICANT CHANGE UP (ref 15–37)
BILIRUB SERPL-MCNC: 0.4 MG/DL — SIGNIFICANT CHANGE UP (ref 0.2–1.2)
BUN SERPL-MCNC: 29 MG/DL — HIGH (ref 7–23)
CALCIUM SERPL-MCNC: 7.4 MG/DL — LOW (ref 8.5–10.1)
CHLORIDE SERPL-SCNC: 108 MMOL/L — SIGNIFICANT CHANGE UP (ref 96–108)
CO2 SERPL-SCNC: 26 MMOL/L — SIGNIFICANT CHANGE UP (ref 22–31)
CREAT SERPL-MCNC: 1.77 MG/DL — HIGH (ref 0.5–1.3)
CULTURE RESULTS: SIGNIFICANT CHANGE UP
CULTURE RESULTS: SIGNIFICANT CHANGE UP
GLUCOSE SERPL-MCNC: 158 MG/DL — HIGH (ref 70–99)
HCT VFR BLD CALC: 33.9 % — LOW (ref 39–50)
HGB BLD-MCNC: 11 G/DL — LOW (ref 13–17)
MCHC RBC-ENTMCNC: 30.4 PG — SIGNIFICANT CHANGE UP (ref 27–34)
MCHC RBC-ENTMCNC: 32.4 GM/DL — SIGNIFICANT CHANGE UP (ref 32–36)
MCV RBC AUTO: 93.6 FL — SIGNIFICANT CHANGE UP (ref 80–100)
NRBC # BLD: 0 /100 WBCS — SIGNIFICANT CHANGE UP (ref 0–0)
PLATELET # BLD AUTO: 162 K/UL — SIGNIFICANT CHANGE UP (ref 150–400)
POTASSIUM SERPL-MCNC: 3.7 MMOL/L — SIGNIFICANT CHANGE UP (ref 3.5–5.3)
POTASSIUM SERPL-SCNC: 3.7 MMOL/L — SIGNIFICANT CHANGE UP (ref 3.5–5.3)
PROT SERPL-MCNC: 5.9 GM/DL — LOW (ref 6–8.3)
RBC # BLD: 3.62 M/UL — LOW (ref 4.2–5.8)
RBC # FLD: 13.8 % — SIGNIFICANT CHANGE UP (ref 10.3–14.5)
SODIUM SERPL-SCNC: 142 MMOL/L — SIGNIFICANT CHANGE UP (ref 135–145)
SPECIMEN SOURCE: SIGNIFICANT CHANGE UP
SPECIMEN SOURCE: SIGNIFICANT CHANGE UP
WBC # BLD: 8.38 K/UL — SIGNIFICANT CHANGE UP (ref 3.8–10.5)
WBC # FLD AUTO: 8.38 K/UL — SIGNIFICANT CHANGE UP (ref 3.8–10.5)

## 2018-11-02 RX ORDER — METFORMIN HYDROCHLORIDE 850 MG/1
1 TABLET ORAL
Qty: 0 | Refills: 0 | COMMUNITY

## 2018-11-02 RX ORDER — METRONIDAZOLE 500 MG
1 TABLET ORAL
Qty: 15 | Refills: 0 | OUTPATIENT
Start: 2018-11-02 | End: 2018-11-06

## 2018-11-02 RX ADMIN — Medication 50 MILLIGRAM(S): at 06:40

## 2018-11-02 RX ADMIN — PANTOPRAZOLE SODIUM 40 MILLIGRAM(S): 20 TABLET, DELAYED RELEASE ORAL at 08:15

## 2018-11-02 RX ADMIN — MEROPENEM 100 MILLIGRAM(S): 1 INJECTION INTRAVENOUS at 06:40

## 2018-11-02 RX ADMIN — Medication 100 MILLIGRAM(S): at 06:41

## 2018-11-02 RX ADMIN — Medication 100 MILLIGRAM(S): at 13:22

## 2018-11-02 RX ADMIN — MEROPENEM 100 MILLIGRAM(S): 1 INJECTION INTRAVENOUS at 13:26

## 2018-11-02 RX ADMIN — HEPARIN SODIUM 5000 UNIT(S): 5000 INJECTION INTRAVENOUS; SUBCUTANEOUS at 06:40

## 2018-11-02 RX ADMIN — SODIUM CHLORIDE 75 MILLILITER(S): 9 INJECTION, SOLUTION INTRAVENOUS at 13:26

## 2018-11-02 NOTE — DISCHARGE NOTE ADULT - SECONDARY DIAGNOSIS.
DM II (diabetes mellitus, type II), controlled HTN (hypertension) Status post placement of cardiac pacemaker Dementia

## 2018-11-02 NOTE — DISCHARGE NOTE ADULT - MEDICATION SUMMARY - MEDICATIONS TO TAKE
I will START or STAY ON the medications listed below when I get home from the hospital:    Flagyl 500 mg oral tablet  -- 1 tab(s) by mouth 3 times a day   -- Do not drink alcoholic beverages when taking this medication.  Finish all this medication unless otherwise directed by prescriber.  May discolor urine or feces.    -- Indication: For Cholecystitis    Aspir 81 oral delayed release tablet  -- 1 tab(s) by mouth once a day  -- Indication: For Prophylactic measure    losartan 100 mg oral tablet  -- 1 tab(s) by mouth once a day  -- Indication: For HTN (hypertension)    glipiZIDE 10 mg oral tablet  -- 1 tab(s) by mouth once a day  -- Indication: For DM II (diabetes mellitus, type II), controlled    metoprolol succinate 50 mg oral tablet, extended release  -- 1 tab(s) by mouth once a day  -- Indication: For HTN (hypertension)    linaclotide 145 mcg oral capsule  -- 1 cap(s) by mouth once a day  -- Indication: For Constipation

## 2018-11-02 NOTE — DISCHARGE NOTE ADULT - HOSPITAL COURSE
patient  treatyed  with  AB  IVF  antalgics for  acute  cholecystitis  with  good  clinical  improvement. patient  and  family    refused  cholecystectomy  and  drainage  by  IR.   abd  pain  resolved patient  eating  well  ambulating  freely  diascharged  home  to  follow  in  office  next  week  to  go to  ER  if  pain  vomting  odr  fever  to  call  me  if  any  further  questions  discussecd  with  patient  and  pt's  wiufe  to  continie  Flagyl;  for  5 more  days  E  prescribed  to  pt's  phamacy  [facility]  in  [facility]

## 2018-11-02 NOTE — DISCHARGE NOTE ADULT - PATIENT PORTAL LINK FT
You can access the [facility] Patient Portal, offered by [facility], by registering with the following website: [email or web address]

## 2018-11-02 NOTE — DISCHARGE NOTE ADULT - MEDICATION SUMMARY - MEDICATIONS TO STOP TAKING
I will STOP taking the medications listed below when I get home from the hospital:    Glucophage 500 mg oral tablet  -- 1 tab(s) by mouth 2 times a day    hydroCHLOROthiazide 12.5 mg oral tablet  -- 1 tab(s) by mouth once a day

## 2018-11-02 NOTE — PROGRESS NOTE ADULT - SUBJECTIVE AND OBJECTIVE BOX
INTERVAL HPI/OVERNIGHT EVENTS:        REVIEW OF SYSTEMS:  CONSTITUTIONAL: feels  well presents appetite  good no  complaints    NECK: No pain or stiffnes  RESPIRATORY: No SOB   CARDIOVASCULAR: No chest pain, palpitations, dizziness,   GASTROINTESTINAL: No abdominal pain. No nausea, vomiting,   NEUROLOGICAL: No headaches, no  blurry  vision no  dizziness  SKIN: No itching,   MUSCULOSKELETAL: No pain    MEDICATION:  acetaminophen  Suppository .. 650 milliGRAM(s) Rectal every 6 hours PRN  dextrose 40% Gel 15 Gram(s) Oral once PRN  dextrose 5% + sodium chloride 0.45%. 1000 milliLiter(s) IV Continuous <Continuous>  dextrose 5%. 1000 milliLiter(s) IV Continuous <Continuous>  dextrose 50% Injectable 12.5 Gram(s) IV Push once  dextrose 50% Injectable 25 Gram(s) IV Push once  dextrose 50% Injectable 25 Gram(s) IV Push once  glucagon  Injectable 1 milliGRAM(s) IntraMuscular once PRN  heparin  Injectable 5000 Unit(s) SubCutaneous every 12 hours  influenza   Vaccine 0.5 milliLiter(s) IntraMuscular once  meropenem  IVPB      meropenem  IVPB 1000 milliGRAM(s) IV Intermittent every 8 hours  metoprolol succinate ER 50 milliGRAM(s) Oral daily  metroNIDAZOLE  IVPB      metroNIDAZOLE  IVPB 500 milliGRAM(s) IV Intermittent every 8 hours  pantoprazole    Tablet 40 milliGRAM(s) Oral before breakfast    Vital Signs Last 24 Hrs  T(C): 36.7 (02 Nov 2018 05:11), Max: 36.9 (01 Nov 2018 17:53)  T(F): 98 (02 Nov 2018 05:11), Max: 98.5 (01 Nov 2018 17:53)  HR: 74 (02 Nov 2018 05:11) (66 - 74)  BP: 177/76 (02 Nov 2018 05:11) (124/47 - 178/66)  BP(mean): --  RR: 18 (02 Nov 2018 05:11) (16 - 18)  SpO2: 97% (02 Nov 2018 05:11) (95% - 97%)    PHYSICAL EXAM:  GENERAL: NAD, well-groomed, well-developed  EYES:  conjunctiva and sclera clear  ENMT:  Moist mucous membranes,   NECK: Supple, No JVD, Normal thyroid  NERVOUS SYSTEM:  Alert oriented   no  focal  deficits;   CHEST/LUNG: Clear    HEART: Regular rate and rhythm; No murmurs, rubs, or gallops  ABDOMEN: Soft, Nontender, Nondistended; Bowel sounds present  EXTREMITIES:  no  edema no  tenderness  SKIN: No rashes   LABS:                        11.0   8.38  )-----------( 162      ( 02 Nov 2018 08:06 )             33.9     11-01    144  |  109<H>  |  31<H>  ----------------------------<  158<H>  3.6   |  26  |  1.86<H>    Ca    7.1<L>      01 Nov 2018 06:30    TPro  5.9<L>  /  Alb  2.1<L>  /  TBili  0.7  /  DBili  x   /  AST  25  /  ALT  45  /  AlkPhos  83  11-01        CAPILLARY BLOOD GLUCOSE      POCT Blood Glucose.: 166 mg/dL (01 Nov 2018 21:23)  POCT Blood Glucose.: 156 mg/dL (01 Nov 2018 16:20)  POCT Blood Glucose.: 195 mg/dL (01 Nov 2018 11:25)      RADIOLOGY & ADDITIONAL TESTS:    Imaging reports  Personally Reviewed:  [ x] YES  [ ] NO    Consultant(s) Notes Reviewed:  [x ] YES  [ ] NO    Care Discussed with Consultants/Other Providers [ x] YES  [ ] NO  Problem/Plan - 1:  ·  Problem: Cholecystitis.  Plan  acute  event  appears  resolved  with  AB  therapy  will  D/C  with  continuation  of  oral  Flagyl  empirically  aware  of  possiible  recurrence  might  benefit  from  elective  cholecystectomy  in  future.  Pt  and  family  will  discuss further  and  follow  with  surgery   Problem/Plan - 2:  ·  Problem: HTN (hypertension).  Plan: -  -Cont to monitor.     Problem/Plan - 3:  ·  Problem: DM II (diabetes mellitus, type II), controlled.  Plan:   -Monitor FS.  restart   sulfonyl urea    Problem/Plan - 4:  ·  Problem: Dementia.  Plan: -Stable.   renal  insufficiency  monitor  with  IV  hydration further  w/u  and  treatment  as per clinical  course    Problem/Plan - 5:  ·  Problem: Pacemaker.  Plan: -Stable.

## 2018-11-02 NOTE — DISCHARGE NOTE ADULT - CARE PROVIDERS DIRECT ADDRESSES
[email or web address]

## 2018-11-02 NOTE — DISCHARGE NOTE ADULT - CARE PLAN
Principal Discharge DX:	Cholecystitis  Goal:	avoidance  of  recurrence  Assessment and plan of treatment:	finish  5  day  course  of  AB  to  ER  if  fever  abd  pain  vomiting  Secondary Diagnosis:	DM II (diabetes mellitus, type II), controlled  Secondary Diagnosis:	HTN (hypertension)  Secondary Diagnosis:	Status post placement of cardiac pacemaker  Secondary Diagnosis:	Dementia

## 2018-11-02 NOTE — PROGRESS NOTE ADULT - REASON FOR ADMISSION
Abdominal pain
Abdominal pain acute  cholecystitis
Abdominal pain

## 2018-11-02 NOTE — DISCHARGE NOTE ADULT - CARE PROVIDER_API CALL
Arash Amado), [facility]  [address] [place] [address]  Phone: [phone]  Fax: [phone]    Julio Conti), Surgery  [address] [place] [address]  [address] [place] [address]  Phone: [phone]  Fax: [phone]

## 2018-11-07 DIAGNOSIS — K81.0 ACUTE CHOLECYSTITIS: ICD-10-CM

## 2018-11-07 DIAGNOSIS — Z28.21 IMMUNIZATION NOT CARRIED OUT BECAUSE OF PATIENT REFUSAL: ICD-10-CM

## 2018-11-07 DIAGNOSIS — R10.9 UNSPECIFIED ABDOMINAL PAIN: ICD-10-CM

## 2018-11-07 DIAGNOSIS — K59.00 CONSTIPATION, UNSPECIFIED: ICD-10-CM

## 2018-11-07 DIAGNOSIS — Z87.891 PERSONAL HISTORY OF NICOTINE DEPENDENCE: ICD-10-CM

## 2018-11-07 DIAGNOSIS — Z88.0 ALLERGY STATUS TO PENICILLIN: ICD-10-CM

## 2018-11-07 DIAGNOSIS — E11.9 TYPE 2 DIABETES MELLITUS WITHOUT COMPLICATIONS: ICD-10-CM

## 2018-11-07 DIAGNOSIS — Z79.82 LONG TERM (CURRENT) USE OF ASPIRIN: ICD-10-CM

## 2018-11-07 DIAGNOSIS — K85.10 BILIARY ACUTE PANCREATITIS WITHOUT NECROSIS OR INFECTION: ICD-10-CM

## 2018-11-07 DIAGNOSIS — E78.5 HYPERLIPIDEMIA, UNSPECIFIED: ICD-10-CM

## 2018-11-07 DIAGNOSIS — E44.0 MODERATE PROTEIN-CALORIE MALNUTRITION: ICD-10-CM

## 2018-11-07 DIAGNOSIS — Z95.0 PRESENCE OF CARDIAC PACEMAKER: ICD-10-CM

## 2018-11-07 DIAGNOSIS — F03.90 UNSPECIFIED DEMENTIA WITHOUT BEHAVIORAL DISTURBANCE: ICD-10-CM

## 2018-11-07 DIAGNOSIS — I10 ESSENTIAL (PRIMARY) HYPERTENSION: ICD-10-CM

## 2018-11-07 DIAGNOSIS — Z79.4 LONG TERM (CURRENT) USE OF INSULIN: ICD-10-CM

## 2021-04-25 NOTE — DISCHARGE NOTE ADULT - ABILITY TO HEAR (WITH HEARING AID OR HEARING APPLIANCE IF NORMALLY USED):
no
no
Mildly to Moderately Impaired: difficulty hearing in some environments or speaker may need to increase volume or speak distinctly
